# Patient Record
Sex: MALE | Race: WHITE | NOT HISPANIC OR LATINO | Employment: OTHER | URBAN - METROPOLITAN AREA
[De-identification: names, ages, dates, MRNs, and addresses within clinical notes are randomized per-mention and may not be internally consistent; named-entity substitution may affect disease eponyms.]

---

## 2018-07-30 ENCOUNTER — OFFICE VISIT (OUTPATIENT)
Dept: FAMILY MEDICINE CLINIC | Facility: CLINIC | Age: 78
End: 2018-07-30
Payer: COMMERCIAL

## 2018-07-30 VITALS
SYSTOLIC BLOOD PRESSURE: 128 MMHG | WEIGHT: 163 LBS | BODY MASS INDEX: 25.58 KG/M2 | HEART RATE: 84 BPM | DIASTOLIC BLOOD PRESSURE: 94 MMHG | HEIGHT: 67 IN

## 2018-07-30 DIAGNOSIS — E78.2 MIXED HYPERLIPIDEMIA: Primary | ICD-10-CM

## 2018-07-30 DIAGNOSIS — M65.30 TRIGGER FINGER, UNSPECIFIED FINGER, UNSPECIFIED LATERALITY: ICD-10-CM

## 2018-07-30 DIAGNOSIS — R41.3 MEMORY LOSS OR IMPAIRMENT: ICD-10-CM

## 2018-07-30 DIAGNOSIS — R97.20 ELEVATED PROSTATE SPECIFIC ANTIGEN (PSA): ICD-10-CM

## 2018-07-30 DIAGNOSIS — E03.9 ACQUIRED HYPOTHYROIDISM: ICD-10-CM

## 2018-07-30 PROCEDURE — 99203 OFFICE O/P NEW LOW 30 MIN: CPT | Performed by: FAMILY MEDICINE

## 2018-07-30 RX ORDER — LEVOTHYROXINE SODIUM 0.07 MG/1
1 TABLET ORAL DAILY
COMMUNITY
Start: 2011-08-05 | End: 2018-09-18 | Stop reason: SDUPTHER

## 2018-07-30 RX ORDER — CHLORHEXIDINE/GLYCERIN/HE-CELL
JELLY (GRAM) TOPICAL
COMMUNITY

## 2018-07-30 NOTE — PATIENT INSTRUCTIONS
Problem List Items Addressed This Visit        Endocrine    Hypothyroidism     Stable  Check TSH  Relevant Medications    levothyroxine 75 mcg tablet    Other Relevant Orders    TSH, 3rd generation       Musculoskeletal and Integument    Trigger finger     Findings of trigger fingers and arthritis  Recommend Hand surgeon to consider injections  Relevant Orders    Ambulatory referral to Hand Surgery       Other    Elevated prostate specific antigen (PSA)     Recommend follow with Uro and kathya free and total PSA first          Relevant Orders    Ambulatory referral to Urology    PSA, total and free    Hyperlipidemia - Primary     Due for check of labs  Follow after that  Relevant Orders    Comprehensive metabolic panel    Cholesterol, total    HDL cholesterol    LDL cholesterol, direct    Memory loss or impairment     Patient is quite concerned about memory loss  Will check labs, and then can follow up with [facility]           Relevant Orders    CBC and differential    RPR    Folate    Vitamin B12

## 2018-07-30 NOTE — PROGRESS NOTES
Assessment/Plan:    Elevated prostate specific antigen (PSA)  Recommend follow with Uro and kathya free and total PSA first     Hyperlipidemia  Due for check of labs  Follow after that  Hypothyroidism  Stable  Check TSH  Memory loss or impairment  Patient is quite concerned about memory loss  Will check labs, and then can follow up with [facility]  Trigger finger  Findings of trigger fingers and arthritis  Recommend Hand surgeon to consider injections  Diagnoses and all orders for this visit:    Mixed hyperlipidemia  -     Comprehensive metabolic panel; Future  -     Cholesterol, total; Future  -     HDL cholesterol; Future  -     LDL cholesterol, direct; Future    Acquired hypothyroidism  -     TSH, 3rd generation; Future    Elevated prostate specific antigen (PSA)  -     Ambulatory referral to Urology; Future  -     PSA, total and free; Future    Memory loss or impairment  -     CBC and differential; Future  -     RPR; Future  -     Folate; Future  -     Vitamin B12; Future    Trigger finger, unspecified finger, unspecified laterality  -     Ambulatory referral to Hand Surgery; Future    Other orders  -     levothyroxine 75 mcg tablet; Take 1 tablet by mouth daily  -     Omega-3 Fatty Acids ([facility] FISH OIL) 1000 MG CAPS; Take by mouth  -     Saw Palmetto, Serenoa repens, ([facility] SAW PALMETTO) 450 MG CAPS; Take by mouth          Subjective:   Re-establish care  No complaints  mjs       Patient ID: Lazarus Oman is a 77 y o  male  Patient is here to review medical conditions and to reestablish care  Has been a bit since he was here last   Due to insurance change, the patient went to a different office in [place]  He did mention that he has some memory issues  He did mention that he has some issues with regard to trying to remember certain things  He will remember them later on, but not right away  He is quite bothered by that  In fact, he has given up doing  because of this    He did mention that it seems to have gotten worse the last couple years  Cholesterol has been a problem for years, and he has been intolerant of medications  He has tried statins on numerous occasions, and had elevated CPK  He also had a problem with Zetia  PSA has been elevated, and the last was 7  He did have previous checks, went to Urology many years ago  At that point, however, his PSA was below 4  The most recent check in September of 2017 showed that it was greater than 7  Left hand there is some changes at 3rd and 5th fingers  Lock at times  Some changes consistant with DJD also noted  On right hand, 2 and 4 are abnormal   He was not able to play golf well due to this  The following portions of the patient's history were reviewed and updated as appropriate: allergies, current medications, past family history, past medical history, past social history, past surgical history and problem list     Review of Systems   Constitutional: Negative  HENT: Negative  Eyes: Negative  Respiratory: Negative  Psychiatric/Behavioral:        Per HPI, patient concerned about memory  All other systems reviewed and are negative  Objective:      /94   Pulse 84   Ht 5' 7" (1 702 m)   Wt 73 9 kg (163 lb)   BMI 25 53 kg/m²          Physical Exam   Constitutional: He is oriented to person, place, and time  He appears well-developed and well-nourished  No distress  HENT:   Head: Normocephalic and atraumatic  Mouth/Throat: Oropharynx is clear and moist  No oropharyngeal exudate  Eyes: Conjunctivae and EOM are normal  Pupils are equal, round, and reactive to light  Neck: Normal range of motion  Neck supple  No tracheal deviation present  No thyromegaly present  Cardiovascular: Normal rate, regular rhythm, normal heart sounds and intact distal pulses  Exam reveals no gallop and no friction rub  No murmur heard    Pulmonary/Chest: Effort normal and breath sounds normal  No respiratory distress  He has no wheezes  He has no rales  Abdominal: Soft  Bowel sounds are normal  He exhibits no distension  There is no tenderness  Musculoskeletal:        Hands:  Patient has multiple knuckles that are also abnormal    Neurological: He is alert and oriented to person, place, and time  Coordination normal    Skin: Skin is warm and dry  He is not diaphoretic  Psychiatric: He has a normal mood and affect  His behavior is normal  Judgment and thought content normal    Nursing note and vitals reviewed

## 2018-08-28 ENCOUNTER — OFFICE VISIT (OUTPATIENT)
Dept: FAMILY MEDICINE CLINIC | Facility: CLINIC | Age: 78
End: 2018-08-28
Payer: COMMERCIAL

## 2018-08-28 VITALS
BODY MASS INDEX: 25.16 KG/M2 | SYSTOLIC BLOOD PRESSURE: 150 MMHG | HEART RATE: 80 BPM | DIASTOLIC BLOOD PRESSURE: 100 MMHG | WEIGHT: 166 LBS | HEIGHT: 68 IN

## 2018-08-28 DIAGNOSIS — E03.9 ACQUIRED HYPOTHYROIDISM: ICD-10-CM

## 2018-08-28 DIAGNOSIS — R25.2 SPASTICITY: ICD-10-CM

## 2018-08-28 DIAGNOSIS — R41.3 MEMORY LOSS OR IMPAIRMENT: ICD-10-CM

## 2018-08-28 DIAGNOSIS — E78.2 MIXED HYPERLIPIDEMIA: ICD-10-CM

## 2018-08-28 DIAGNOSIS — R97.20 ELEVATED PROSTATE SPECIFIC ANTIGEN (PSA): Primary | ICD-10-CM

## 2018-08-28 DIAGNOSIS — E53.8 B12 DEFICIENCY: ICD-10-CM

## 2018-08-28 PROCEDURE — 99214 OFFICE O/P EST MOD 30 MIN: CPT | Performed by: FAMILY MEDICINE

## 2018-08-28 PROCEDURE — 1160F RVW MEDS BY RX/DR IN RCRD: CPT | Performed by: FAMILY MEDICINE

## 2018-08-28 PROCEDURE — 3008F BODY MASS INDEX DOCD: CPT | Performed by: FAMILY MEDICINE

## 2018-08-28 PROCEDURE — 3725F SCREEN DEPRESSION PERFORMED: CPT | Performed by: FAMILY MEDICINE

## 2018-08-28 PROCEDURE — 1036F TOBACCO NON-USER: CPT | Performed by: FAMILY MEDICINE

## 2018-08-28 PROCEDURE — 1101F PT FALLS ASSESS-DOCD LE1/YR: CPT | Performed by: FAMILY MEDICINE

## 2018-08-28 RX ORDER — PYRIDOXINE HCL (VITAMIN B6) 50 MG
50 TABLET ORAL DAILY
Qty: 90 TABLET | Refills: 3 | Status: SHIPPED | OUTPATIENT
Start: 2018-08-28 | End: 2021-02-25 | Stop reason: ALTCHOICE

## 2018-08-28 NOTE — ASSESSMENT & PLAN NOTE
Mini-mental status exam today was 30/30  Patient did quite well  No particular problems at the moment  Clock drawing test was minor early abnormal, more specifically he left the numbers off the clock  Otherwise, it was acceptable  Patient may be having some processing issues with this  At this point, I would recommend a vitamin B12 supplement, as his B12 level was slightly low

## 2018-08-28 NOTE — ASSESSMENT & PLAN NOTE
PSA continues to be elevated, and the % free corresponds to an increased risk of prostate cancer based on patient's age  Given this, refer to [facility] as they do accept his insurance

## 2018-08-28 NOTE — ASSESSMENT & PLAN NOTE
Cholesterol is fairly reasonable  At this point I would not recommend statins secondary to their potential for side effects  Continue on Omega threes, recheck in the future  By this, I mean at least 1 year from now

## 2018-08-28 NOTE — PROGRESS NOTES
Assessment/Plan:    Memory loss or impairment  Mini-mental status exam today was 30/30  Patient did quite well  No particular problems at the moment  Clock drawing test was minor early abnormal, more specifically he left the numbers off the clock  Otherwise, it was acceptable  Patient may be having some processing issues with this  At this point, I would recommend a vitamin B12 supplement, as his B12 level was slightly low  Hypothyroidism  TSH was normal   No changes  Follow-up in the future  Hyperlipidemia  Cholesterol is fairly reasonable  At this point I would not recommend statins secondary to their potential for side effects  Continue on Omega threes, recheck in the future  By this, I mean at least 1 year from now  Elevated prostate specific antigen (PSA)  PSA continues to be elevated, and the % free corresponds to an increased risk of prostate cancer based on patient's age  Given this, refer to [facility] as they do accept his insurance  B12 deficiency  Patient's B12 level is slightly low  I would recommend supplementation  Follow-up in the future  Spasticity  Some spasticity in movement  I would recommend CT to rule out CVA  Diagnoses and all orders for this visit:    Elevated prostate specific antigen (PSA)  -     Ambulatory referral to Urology; Future    Memory loss or impairment  -     vitamin B-12 (CYANOCOBALAMIN) 50 MCG TABS; Take 1 tablet (50 mcg total) by mouth daily  -     Vitamin B12; Future  -     TSH, 3rd generation; Future  -     CT head wo contrast; Future    Acquired hypothyroidism  -     TSH, 3rd generation; Future    Mixed hyperlipidemia    B12 deficiency    Spasticity  -     CT head wo contrast; Future          Subjective: pt presents to review recent bloodwork  --bb     Patient ID: Jorge Mcdonald is a 77 y o  male  Patient mentioned that he does want to see Urology    Unfortunately, we had set him up with Dr Sony Spencer and that office does not take his insurance  He wished to go to a different urologist     He is having some expressive aphasia, more specifically he mentioned that he knows what he wants to say the someone, just can't find the words all the time  After a bit, he does find the words, however  It may be after the conversation has concluded, unfortunately  With regard to cholesterol, he is not currently on medications other than omega-3 free fatty acids  He is on Synthroid for hypothyroidism  TSH is fairly stable as well  RPR was normal, folate was normal, however B12 was slightly low  The following portions of the patient's history were reviewed and updated as appropriate: allergies, current medications, past family history, past medical history, past social history, past surgical history and problem list     Review of Systems   Constitutional: Negative  HENT: Negative  Eyes: Negative  Respiratory: Negative  Cardiovascular: Negative  Gastrointestinal: Negative  Endocrine: Negative  Genitourinary: Negative  Musculoskeletal: Negative  Skin: Negative  Allergic/Immunologic: Negative  Neurological:        Memory loss  Hematological: Negative  Psychiatric/Behavioral: Positive for decreased concentration (memory issues)  Negative for suicidal ideas  Laboratory studies reviewed  Please see copies on the chart  TSH 2 2  This is essentially normal, and slightly elevated versus before  PSA 6 42   % free is 15  Total cholesterol 184, , HDL 56  RPR negative  B12 175 (normal 210), folate 10 8  CBC normal   Blood sugar 81, Creat 0 89, GFR 81, Na normal, K normal   AST 18, ALT 16     Objective:      /100 (BP Location: Left arm, Patient Position: Sitting, Cuff Size: Standard)   Pulse 80   Ht 5' 7 5" (1 715 m)   Wt 75 3 kg (166 lb)   BMI 25 62 kg/m²          Physical Exam   Constitutional: He appears well-developed and well-nourished     HENT:   Head: Normocephalic and atraumatic  Neck: Normal range of motion  Neck supple  Cardiovascular: Normal rate, regular rhythm and normal heart sounds  Exam reveals no gallop and no friction rub  No murmur heard  Pulses:       Carotid pulses are 2+ on the right side, and 2+ on the left side  Pulmonary/Chest: Effort normal and breath sounds normal  No respiratory distress  He has no wheezes  He has no rales  Nursing note and vitals reviewed

## 2018-08-28 NOTE — PATIENT INSTRUCTIONS
Problem List Items Addressed This Visit        Endocrine    Hypothyroidism     TSH was normal   No changes  Follow-up in the future  Relevant Orders    TSH, 3rd generation       Other    Elevated prostate specific antigen (PSA) - Primary     PSA continues to be elevated, and the % free corresponds to an increased risk of prostate cancer based on patient's age  Given this, refer to [facility] as they do accept his insurance  Relevant Orders    Ambulatory referral to Urology    Hyperlipidemia     Cholesterol is fairly reasonable  At this point I would not recommend statins secondary to their potential for side effects  Continue on Omega threes, recheck in the future  By this, I mean at least 1 year from now  Memory loss or impairment     Mini-mental status exam today was 30/30  Patient did quite well  No particular problems at the moment  Clock drawing test was minor early abnormal, more specifically he left the numbers off the clock  Otherwise, it was acceptable  Patient may be having some processing issues with this  At this point, I would recommend a vitamin B12 supplement, as his B12 level was slightly low  Relevant Medications    vitamin B-12 (CYANOCOBALAMIN) 50 MCG TABS    Other Relevant Orders    Vitamin B12    TSH, 3rd generation    CT head wo contrast    B12 deficiency     Patient's B12 level is slightly low  I would recommend supplementation  Follow-up in the future  Spasticity     Some spasticity in movement  I would recommend CT to rule out CVA           Relevant Orders    CT head wo contrast

## 2018-09-18 ENCOUNTER — TELEPHONE (OUTPATIENT)
Dept: FAMILY MEDICINE CLINIC | Facility: CLINIC | Age: 78
End: 2018-09-18

## 2018-09-18 DIAGNOSIS — E03.9 ACQUIRED HYPOTHYROIDISM: Primary | ICD-10-CM

## 2018-09-18 RX ORDER — LEVOTHYROXINE SODIUM 0.07 MG/1
75 TABLET ORAL DAILY
Qty: 90 TABLET | Refills: 3 | Status: SHIPPED | OUTPATIENT
Start: 2018-09-18 | End: 2019-09-16 | Stop reason: SDUPTHER

## 2018-09-18 NOTE — TELEPHONE ENCOUNTER
Pt called and needs refill on Levothyroxine 75mcg  90 days at a time refilled 3x  Pharmacy: [facility] on [address]      Thank you

## 2018-12-06 ENCOUNTER — OFFICE VISIT (OUTPATIENT)
Dept: FAMILY MEDICINE CLINIC | Facility: CLINIC | Age: 78
End: 2018-12-06
Payer: COMMERCIAL

## 2018-12-06 VITALS
HEART RATE: 80 BPM | DIASTOLIC BLOOD PRESSURE: 76 MMHG | HEIGHT: 68 IN | SYSTOLIC BLOOD PRESSURE: 128 MMHG | BODY MASS INDEX: 25.46 KG/M2 | WEIGHT: 168 LBS

## 2018-12-06 DIAGNOSIS — E53.8 B12 DEFICIENCY: ICD-10-CM

## 2018-12-06 DIAGNOSIS — E03.9 ACQUIRED HYPOTHYROIDISM: Primary | ICD-10-CM

## 2018-12-06 DIAGNOSIS — R41.3 MEMORY LOSS OR IMPAIRMENT: ICD-10-CM

## 2018-12-06 DIAGNOSIS — R25.2 SPASTICITY: ICD-10-CM

## 2018-12-06 PROCEDURE — 3008F BODY MASS INDEX DOCD: CPT | Performed by: FAMILY MEDICINE

## 2018-12-06 PROCEDURE — 1160F RVW MEDS BY RX/DR IN RCRD: CPT | Performed by: FAMILY MEDICINE

## 2018-12-06 PROCEDURE — 4040F PNEUMOC VAC/ADMIN/RCVD: CPT | Performed by: FAMILY MEDICINE

## 2018-12-06 PROCEDURE — 99214 OFFICE O/P EST MOD 30 MIN: CPT | Performed by: FAMILY MEDICINE

## 2018-12-06 NOTE — ASSESSMENT & PLAN NOTE
Patient's movements today were somewhat spastic as well  Again, I recommended that he have the CT scan that was ordered previously  He did not recall having that order, though was on the after visit summary

## 2018-12-06 NOTE — PATIENT INSTRUCTIONS
Problem List Items Addressed This Visit        Endocrine    Hypothyroidism - Primary     He had labs ordered before  He does not recall that they were ordered  I recommend that he go to the lab after office visit to have this drawn  Other    Memory loss or impairment     Patient did score well previously on the mental status exam, however he still does have some issues with regard to memory  Unfortunately, he did not recall me asking to have a CT scan, or blood work  This is in contrast to him previously as I recall seeing him previously  He could not understand that this was something that I had recommended previously  Time line was also somewhat abnormal, mentioning initially that it was 1 month ago when it was in August          B12 deficiency     He was to have a B12 level done, but did not recall that I had ordered it  I reprinted this order and asked that he go to the lab after this OV to get this done  Spasticity     Patient's movements today were somewhat spastic as well  Again, I recommended that he have the CT scan that was ordered previously  He did not recall having that order, though was on the after visit summary

## 2018-12-06 NOTE — PROGRESS NOTES
Assessment/Plan:    B12 deficiency  He was to have a B12 level done, but did not recall that I had ordered it  I reprinted this order and asked that he go to the lab after this OV to get this done  Hypothyroidism  He had labs ordered before  He does not recall that they were ordered  I recommend that he go to the lab after office visit to have this drawn  Memory loss or impairment  Patient did score well previously on the mental status exam, however he still does have some issues with regard to memory  Unfortunately, he did not recall me asking to have a CT scan, or blood work  This is in contrast to him previously as I recall seeing him previously  He could not understand that this was something that I had recommended previously  Time line was also somewhat abnormal, mentioning initially that it was 1 month ago when it was in August     Spasticity  Patient's movements today were somewhat spastic as well  Again, I recommended that he have the CT scan that was ordered previously  He did not recall having that order, though was on the after visit summary  Diagnoses and all orders for this visit:    Acquired hypothyroidism    Memory loss or impairment    B12 deficiency    Spasticity          After the visit, I confirmed with patient's wife, Larry Mace, that the patient did indeed get told about the labs, and the CT scan  She mentioned that family has also noticed he has had some issues with memory  I informed her of the current issues with regard to laboratory studies, as well as the CT scan  After this, I would probably recommend Neurology  I did speak with her about that, but not make the recommendations specifically at that time  Of note, patient no longer has a home phone, just a cell phone  His wife also just has a cell phone  Her number is [phone]  Subjective: Follow up   Pt states he was started on Vitamin B12 and he thinks that is why he needed to follow up  kw     Patient ID: Edward Esposito is a 77 y o  male  Patient is here today for follow-up  He reports that he is doing quite well over the last month  He has not noticed anything significant  He did mention that he is having more bowel movements than normal   The bowel movements are more frequent, but less size wise  There was no specific changes with the bowels otherwise, just more frequent  There is no pain with passing stools, no difficulty in passing them either  He is taking vitamin B12 now, but is using half a tablet so that he can get 50 mcg  Patient reports he was unaware of the labs he was to have, as well as he did not know about the CT scan  The following portions of the patient's history were reviewed and updated as appropriate: allergies, current medications, past family history, past medical history, past social history, past surgical history and problem list     Review of Systems   Constitutional: Negative  HENT: Negative  Respiratory: Negative  Cardiovascular: Negative  Neurological: Negative  Psychiatric/Behavioral: Negative  All other systems reviewed and are negative  Objective:      /76 (BP Location: Left arm)   Pulse 80   Ht 5' 7 5" (1 715 m)   Wt 76 2 kg (168 lb)   BMI 25 92 kg/m²          Physical Exam   Constitutional: He is oriented to person, place, and time  He appears well-developed and well-nourished  HENT:   Head: Normocephalic and atraumatic  Neck: Normal range of motion  Neck supple  Cardiovascular: Normal rate, regular rhythm and normal heart sounds  Exam reveals no gallop and no friction rub  No murmur heard  Pulses:       Carotid pulses are 2+ on the right side, and 2+ on the left side  Pulmonary/Chest: Effort normal and breath sounds normal  No respiratory distress  He has no wheezes  He has no rales  Neurological: He is alert and oriented to person, place, and time  He has normal reflexes   Coordination abnormal  Psychiatric: He has a normal mood and affect  He exhibits abnormal recent memory  Nursing note and vitals reviewed

## 2018-12-06 NOTE — ASSESSMENT & PLAN NOTE
Patient did score well previously on the mental status exam, however he still does have some issues with regard to memory  Unfortunately, he did not recall me asking to have a CT scan, or blood work  This is in contrast to him previously as I recall seeing him previously  He could not understand that this was something that I had recommended previously    Time line was also somewhat abnormal, mentioning initially that it was 1 month ago when it was in August

## 2018-12-06 NOTE — ASSESSMENT & PLAN NOTE
He had labs ordered before  He does not recall that they were ordered  I recommend that he go to the lab after office visit to have this drawn

## 2018-12-06 NOTE — ASSESSMENT & PLAN NOTE
He was to have a B12 level done, but did not recall that I had ordered it  I reprinted this order and asked that he go to the lab after this OV to get this done

## 2018-12-24 ENCOUNTER — TELEPHONE (OUTPATIENT)
Dept: FAMILY MEDICINE CLINIC | Facility: CLINIC | Age: 78
End: 2018-12-24

## 2018-12-24 NOTE — TELEPHONE ENCOUNTER
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

## 2018-12-24 NOTE — TELEPHONE ENCOUNTER
----- Message from Caitlyn Steen MD sent at 12/21/2018 12:58 PM EST -----  I wanted this to be a message to you guys so you would understand what was going on, i e  see previous task from this patient that Topher Leonard had sent me  It was a task from verify results  I did not necessarily want all of this in the chart right away   ----- Message -----  From: Grisel Pleitez  Sent: 12/21/2018  10:36 AM  To: Caitlyn Steen MD    Can you please let me know what this message is in reference to  It's like you picked up on a conversation in the middle  Please clarify  ----- Message -----  From: Caitlyn Steen MD  Sent: 12/20/2018   5:05 PM  To: [address]     My plan from at this point is not to do anything different, just to let his wife know that there were some changes relating to vascular issues  He still has issues with regard to dementia  I would follow up at his next visit, and would ask that she come with him

## 2019-01-11 ENCOUNTER — OFFICE VISIT (OUTPATIENT)
Dept: FAMILY MEDICINE CLINIC | Facility: CLINIC | Age: 79
End: 2019-01-11
Payer: COMMERCIAL

## 2019-01-11 ENCOUNTER — TELEPHONE (OUTPATIENT)
Dept: NEUROLOGY | Facility: CLINIC | Age: 79
End: 2019-01-11

## 2019-01-11 VITALS
HEIGHT: 68 IN | RESPIRATION RATE: 16 BRPM | SYSTOLIC BLOOD PRESSURE: 124 MMHG | WEIGHT: 164 LBS | HEART RATE: 76 BPM | BODY MASS INDEX: 24.86 KG/M2 | DIASTOLIC BLOOD PRESSURE: 72 MMHG

## 2019-01-11 DIAGNOSIS — R41.3 MEMORY LOSS OR IMPAIRMENT: ICD-10-CM

## 2019-01-11 DIAGNOSIS — E78.2 MIXED HYPERLIPIDEMIA: ICD-10-CM

## 2019-01-11 DIAGNOSIS — R90.82 WHITE MATTER DISEASE: Primary | ICD-10-CM

## 2019-01-11 DIAGNOSIS — R97.20 ELEVATED PROSTATE SPECIFIC ANTIGEN (PSA): ICD-10-CM

## 2019-01-11 DIAGNOSIS — R25.2 SPASTICITY: ICD-10-CM

## 2019-01-11 PROCEDURE — 3725F SCREEN DEPRESSION PERFORMED: CPT | Performed by: FAMILY MEDICINE

## 2019-01-11 PROCEDURE — G0439 PPPS, SUBSEQ VISIT: HCPCS | Performed by: FAMILY MEDICINE

## 2019-01-11 PROCEDURE — 1125F AMNT PAIN NOTED PAIN PRSNT: CPT | Performed by: FAMILY MEDICINE

## 2019-01-11 PROCEDURE — 1170F FXNL STATUS ASSESSED: CPT | Performed by: FAMILY MEDICINE

## 2019-01-11 PROCEDURE — 99214 OFFICE O/P EST MOD 30 MIN: CPT | Performed by: FAMILY MEDICINE

## 2019-01-11 PROCEDURE — 1101F PT FALLS ASSESS-DOCD LE1/YR: CPT | Performed by: FAMILY MEDICINE

## 2019-01-11 NOTE — PATIENT INSTRUCTIONS
Problem List Items Addressed This Visit        Nervous and Auditory    White matter disease - Primary     Given that the CT did show some white matter disease as well as lacunar infarcts, is likely that some of his memory loss is secondary to CVA/white matter disease  At this point, the best bet for him is to try and keep his cholesterol is low as possible, keep the blood pressure under control, and follow up as needed  This may reoccur down the road, or get worse as time goes by  He also has the option to go to Neurology if he chooses  Relevant Orders    Ambulatory referral to Neurology       Other    Elevated prostate specific antigen (PSA)     Reviewed note from Neurology  They recommended that the patient repeat a PSA in approximately 1 year  Hyperlipidemia     Given that the patient previously had problems with statins, and that is LDL is slightly over 100, it is reasonable to consider treatment however I do not expect that he will do well with treatment  At this point, would not make any changes  Continue with the Omega 3 free fatty acids (Fish oil)  Memory loss or impairment     Given that the CT did show some white matter disease as well as lacunar infarcts, is likely that some of his memory loss is secondary to CVA/white matter disease  At this point, the best bet for him is to try and keep his cholesterol is low as possible, keep the blood pressure under control, and follow up as needed  This may reoccur down the road, or get worse as time goes by  He also has the option to go to Neurology if he chooses  Relevant Orders    Ambulatory referral to Neurology    Spasticity     Movements are abnormal for a bit now  I would recommend follow with Neuro as there is a change in movement  I would question Parkinsons vs vascular changes             Relevant Orders    Ambulatory referral to Neurology

## 2019-01-11 NOTE — ASSESSMENT & PLAN NOTE
Given that the CT did show some white matter disease as well as lacunar infarcts, is likely that some of his memory loss is secondary to CVA/white matter disease  At this point, the best bet for him is to try and keep his cholesterol is low as possible, keep the blood pressure under control, and follow up as needed  This may reoccur down the road, or get worse as time goes by  He also has the option to go to Neurology if he chooses

## 2019-01-11 NOTE — ASSESSMENT & PLAN NOTE
Reviewed note from Neurology  They recommended that the patient repeat a PSA in approximately 1 year

## 2019-01-11 NOTE — ASSESSMENT & PLAN NOTE
Movements are abnormal for a bit now  I would recommend follow with Neuro as there is a change in movement  I would question Parkinsons vs vascular changes

## 2019-01-11 NOTE — PROGRESS NOTES
Assessment/Plan:    Memory loss or impairment  Given that the CT did show some white matter disease as well as lacunar infarcts, is likely that some of his memory loss is secondary to CVA/white matter disease  At this point, the best bet for him is to try and keep his cholesterol is low as possible, keep the blood pressure under control, and follow up as needed  This may reoccur down the road, or get worse as time goes by  He also has the option to go to Neurology if he chooses  White matter disease  Given that the CT did show some white matter disease as well as lacunar infarcts, is likely that some of his memory loss is secondary to CVA/white matter disease  At this point, the best bet for him is to try and keep his cholesterol is low as possible, keep the blood pressure under control, and follow up as needed  This may reoccur down the road, or get worse as time goes by  He also has the option to go to Neurology if he chooses  Hyperlipidemia  Given that the patient previously had problems with statins, and that is LDL is slightly over 100, it is reasonable to consider treatment however I do not expect that he will do well with treatment  At this point, would not make any changes  Continue with the Omega 3 free fatty acids (Fish oil)  Elevated prostate specific antigen (PSA)  Reviewed note from Neurology  They recommended that the patient repeat a PSA in approximately 1 year  Spasticity  Movements are abnormal for a bit now  I would recommend follow with Neuro as there is a change in movement  I would question Parkinsons vs vascular changes  Diagnoses and all orders for this visit:    White matter disease  -     Ambulatory referral to Neurology; Future    Memory loss or impairment  -     Ambulatory referral to Neurology; Future    Elevated prostate specific antigen (PSA)    Mixed hyperlipidemia    Spasticity  -     Ambulatory referral to Neurology;  Future          Subjective: Pt here today for CT Scan results  YENI Danielle     Patient ID: Orville Setting is a 77 y o  male  Patient here to review studies after last OV  He did mention that he found something to review nocturia  He found something in an ad that was discussing BPH and injecting into the prostate or around the prostate to decrease urinary issues  The following portions of the patient's history were reviewed and updated as appropriate: allergies, current medications, past family history, past medical history, past social history, past surgical history and problem list     Review of Systems   Constitutional: Negative  HENT: Negative  Eyes: Negative  Respiratory: Negative  Cardiovascular: Negative  Gastrointestinal: Negative  Endocrine: Negative  Genitourinary: Negative  Musculoskeletal: Negative  Skin: Negative  Allergic/Immunologic: Negative  Neurological: Negative  Hematological: Negative  Psychiatric/Behavioral: Negative  Objective:      Vitals:    01/11/19 1253   BP: 124/72   BP Location: Left arm   Patient Position: Sitting   Cuff Size: Standard   Pulse: 76   Resp: 16   Weight: 74 4 kg (164 lb)   Height: 5' 7 5" (1 715 m)          Physical Exam   Constitutional: He appears well-developed and well-nourished  HENT:   Head: Normocephalic and atraumatic  Neck: Normal range of motion  Neck supple  Cardiovascular: Normal rate, regular rhythm and normal heart sounds  Exam reveals no gallop and no friction rub  No murmur heard  Pulses:       Carotid pulses are 2+ on the right side, and 2+ on the left side  Pulmonary/Chest: Effort normal and breath sounds normal  No respiratory distress  He has no wheezes  He has no rales  Nursing note and vitals reviewed

## 2019-01-11 NOTE — PROGRESS NOTES
Assessment and Plan:    Problem List Items Addressed This Visit        Nervous and Auditory    White matter disease - Primary     Given that the CT did show some white matter disease as well as lacunar infarcts, is likely that some of his memory loss is secondary to CVA/white matter disease  At this point, the best bet for him is to try and keep his cholesterol is low as possible, keep the blood pressure under control, and follow up as needed  This may reoccur down the road, or get worse as time goes by  He also has the option to go to Neurology if he chooses  Relevant Orders    Ambulatory referral to Neurology       Other    Elevated prostate specific antigen (PSA)     Reviewed note from Neurology  They recommended that the patient repeat a PSA in approximately 1 year  Hyperlipidemia     Given that the patient previously had problems with statins, and that is LDL is slightly over 100, it is reasonable to consider treatment however I do not expect that he will do well with treatment  At this point, would not make any changes  Continue with the Omega 3 free fatty acids (Fish oil)  Memory loss or impairment     Given that the CT did show some white matter disease as well as lacunar infarcts, is likely that some of his memory loss is secondary to CVA/white matter disease  At this point, the best bet for him is to try and keep his cholesterol is low as possible, keep the blood pressure under control, and follow up as needed  This may reoccur down the road, or get worse as time goes by  He also has the option to go to Neurology if he chooses  Relevant Orders    Ambulatory referral to Neurology    Spasticity     Movements are abnormal for a bit now  I would recommend follow with Neuro as there is a change in movement  I would question Parkinsons vs vascular changes             Relevant Orders    Ambulatory referral to Neurology        Health Maintenance Due   Topic Date Due    Medicare Annual Wellness Visit (AWV)  1940    CRC Screening: Colonoscopy  1940    DTaP,Tdap,and Td Vaccines (1 - Tdap) 01/15/1960    Pneumococcal PPSV23/PCV13 65+ Years / High and Highest Risk (2 of 2 - PCV13) 10/20/2017         HPI:  Victoria Sinclair is a 77 y o  male here for his Subsequent Wellness Visit  Patient Active Problem List   Diagnosis    Allergic rhinitis    Arthritis    Actinic keratosis    Elevated prostate specific antigen (PSA)    Erectile dysfunction of non-organic origin    External hemorrhoids    Gout    Hyperlipidemia    Hypothyroidism    Memory loss or impairment    Trigger finger    B12 deficiency    Spasticity    White matter disease     Past Medical History:   Diagnosis Date    Basal cell adenocarcinoma     right thigh     Past Surgical History:   Procedure Laterality Date    VASECTOMY       Family History   Problem Relation Age of Onset    Alzheimer's disease Mother     Lymphoma Father      History   Smoking Status    Never Smoker   Smokeless Tobacco    Never Used     History   Alcohol Use    Yes     Comment: occasional      History   Drug Use No       Current Outpatient Prescriptions   Medication Sig Dispense Refill    levothyroxine 75 mcg tablet Take 1 tablet (75 mcg total) by mouth daily 90 tablet 3    Omega-3 Fatty Acids ([facility] FISH OIL) 1000 MG CAPS Take by mouth      vitamin B-12 (CYANOCOBALAMIN) 50 MCG TABS Take 1 tablet (50 mcg total) by mouth daily 90 tablet 3     No current facility-administered medications for this visit        Allergies   Allergen Reactions    Simvastatin Myalgia     Other reaction(s): elevated CPK, Rhabdomyolysis    Statins Myalgia     Immunization History   Administered Date(s) Administered    H1N1, All Formulations 01/08/2010    Influenza 10/20/2016    Influenza Recombinant Preservative Free Im 10/09/2018    Influenza Split High Dose Preservative Free IM 12/10/2012, 09/25/2014    Pneumococcal Polysaccharide PPV23 02/16/2005, 10/20/2016       Patient Care Team:  Raul Otto MD as PCP - General    Medicare Screening Tests and Risk Assessments:  Sergio Cantu is here for his Subsequent Wellness visit  Health Risk Assessment:  Patient rates overall health as very good  Patient feels that their physical health rating is Same  Eyesight was rated as Same  Hearing was rated as Same  Patient feels that their emotional and mental health rating is Same  Pain experienced by patient in the last 7 days has been None  Patient's pain rating has been 1/10  Patient states that he has experienced no weight loss or gain in last 6 months  Emotional/Mental Health:  Patient has been feeling nervous/anxious  PHQ-9 Depression Screening:    Frequency of the following problems over the past two weeks:      1  Little interest or pleasure in doing things: 0 - not at all      2  Feeling down, depressed, or hopeless: 0 - not at all  PHQ-2 Score: 0          Broken Bones/Falls: Fall Risk Assessment:    In the past year, patient has experienced: No history of falling in past year          Bladder/Bowel:  Patient has not leaked urine accidently in the last six months  Patient reports no loss of bowel control  Immunizations:  Patient has had a flu vaccination within the last year  Patient has received a pneumonia shot  Patient has received a shingles shot  Patient has not received tetanus/diphtheria shot  Home Safety:  Patient does not have trouble with stairs inside or outside of their home  Patient currently reports that there are no safety hazards present in home, working smoke alarms, working carbon monoxide detectors        Preventative Screenings:   prostate cancer screen performed, colon cancer screen completed, cholesterol screen completed, glaucoma eye exam completed,     Nutrition:  Current diet: Regular and Limited junk food with servings of the following:    Medications:  Patient is currently taking over-the-counter supplements  Patient is able to manage medications  Lifestyle Choices:  Patient reports no tobacco use  Patient has not smoked or used tobacco in the past   Patient reports no alcohol use  Patient drives a vehicle  Patient wears seat belt  Activities of Daily Living:  Can get out of bed by his or her self, able to dress self, able to make own meals, able to do own shopping, able to bathe self, can do own laundry/housekeeping, can manage own money, pay bills and track expenses    Previous Hospitalizations:  No hospitalization or ED visit in past 12 months        Advanced Directives:  Patient has decided on a power of   Patient has spoken to designated power of   Patient has completed advanced directive  Preventative Screening/Counseling:      Cardiovascular:      General: Screening Not Indicated          Diabetes:      General: Screening Current          Colorectal Cancer:      General: Screening Current          Prostate Cancer:      General: Screening Current      Comments: uro at [facility]        Osteoporosis:      General: Risks and Benefits Discussed      Counseling: Calcium and Vitamin D Intake and Regular Weightbearing Exercise          AAA:      General: Patient Declines          Glaucoma:      General: Risks and Benefits Discussed      Comments: Eyecare professional: 1 week ago  HIV:      General: Screening Not Indicated          Hepatitis C:      General: Screening Not Indicated        Advanced Directives:   Patient has no living will for healthcare, does not have durable POA for healthcare, patient does not have an advanced directive  5 wishes given

## 2019-01-11 NOTE — ASSESSMENT & PLAN NOTE
Given that the patient previously had problems with statins, and that is LDL is slightly over 100, it is reasonable to consider treatment however I do not expect that he will do well with treatment  At this point, would not make any changes  Continue with the Omega 3 free fatty acids (Fish oil)

## 2019-01-14 ENCOUNTER — DOCUMENTATION (OUTPATIENT)
Dept: FAMILY MEDICINE CLINIC | Facility: CLINIC | Age: 79
End: 2019-01-14

## 2019-01-14 NOTE — PROGRESS NOTES
Patient brought in a clipping from a recent magazine  It is a noose letter called [employer]  The page is presented to me showed that the patient was considering a procedure that was listed called prostate artery embolization  This was supposed to help shrink the prostate without surgery, medications, or other dysfunction  This was only a small particle printed in this news letter, and still discuss the possibility of clinical trials for this  Based on this, this is not currently indicated for BPH  It would be considered invasive, and would potentially have some significant side effects  Of note, I would not recommend this at this time, and would recommend that he speak with Urology if he is having more issues with regard to urination  He is currently following with them as well

## 2019-03-14 ENCOUNTER — TELEPHONE (OUTPATIENT)
Dept: NEUROLOGY | Facility: CLINIC | Age: 79
End: 2019-03-14

## 2019-03-15 ENCOUNTER — TELEPHONE (OUTPATIENT)
Dept: NEUROLOGY | Facility: CLINIC | Age: 79
End: 2019-03-15

## 2019-03-15 ENCOUNTER — OFFICE VISIT (OUTPATIENT)
Dept: NEUROLOGY | Facility: CLINIC | Age: 79
End: 2019-03-15
Payer: COMMERCIAL

## 2019-03-15 VITALS
HEIGHT: 68 IN | WEIGHT: 166 LBS | DIASTOLIC BLOOD PRESSURE: 89 MMHG | SYSTOLIC BLOOD PRESSURE: 164 MMHG | HEART RATE: 83 BPM | BODY MASS INDEX: 25.16 KG/M2

## 2019-03-15 DIAGNOSIS — G31.84 AMNESTIC MCI (MILD COGNITIVE IMPAIRMENT WITH MEMORY LOSS): ICD-10-CM

## 2019-03-15 DIAGNOSIS — R41.3 MEMORY LOSS OR IMPAIRMENT: ICD-10-CM

## 2019-03-15 DIAGNOSIS — R90.82 WHITE MATTER DISEASE: ICD-10-CM

## 2019-03-15 DIAGNOSIS — R25.2 SPASTICITY: ICD-10-CM

## 2019-03-15 DIAGNOSIS — I63.313 CEREBROVASCULAR ACCIDENT (CVA) DUE TO BILATERAL THROMBOSIS OF MIDDLE CEREBRAL ARTERIES (HCC): Primary | ICD-10-CM

## 2019-03-15 PROCEDURE — 99205 OFFICE O/P NEW HI 60 MIN: CPT | Performed by: PSYCHIATRY & NEUROLOGY

## 2019-03-15 NOTE — TELEPHONE ENCOUNTER
Pt's wife Afshin Barrientos called stating pt refused to allow her to accompany him to today's appt  She states is trying to be supportive of his wishes but is concerned as he has been having memory issues  I made her aware that we cannot release notes without JUAN from pt  I made her aware that pt did add her to his communication consent form  Appt in progress and therefore no office note to review  I advised her that pt will be provided with AVS and copies of any orders  Wife verbalized understanding and will call us with any additional concerns

## 2019-03-15 NOTE — PROGRESS NOTES
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 ([facility] [address] ) - Primary    Relevant Orders    MRI brain NeuroQuant wo contrast    Amnestic MCI (mild cognitive impairment with memory loss)       Other    Memory loss or impairment    Relevant Orders    MRI brain NeuroQuant wo contrast    VAS carotid complete study    Ambulatory referral to Speech Therapy    TSH, 3rd generation with Free T4 reflex    Vitamin B12    LATISHA and PE,Serum    Spasticity         Mr Ayla Sawyer has presented for evaluation of white matter disease wit CT head was completed in 2018 nd was consistent with 2 small lacunar strokes in MCA distribution  Patient is aware, that with no CD, I would not be able to review imaging as they were completed at [facility]  Patient was advised starting aspirin 81 mg Po daily  MRI brain with Neuroquant was added along with Carotid Doppler US to complete neurologic part of work up of the stroke  Patient has no focal neurologic deficit on exam due to been physically active  Mercedes Iniguez will be advised to be completed by Primary care team   Patient also was evaluated for dementia - 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 type of MCI with stable visuo-spatial and executive function  ,Patient was advised to have Cognitive therapy, with no recommendations for for aricept as no basic work up had been completed for cardiac function stand point including LVPG/SLPG teams  Follow up with [facility] Nurse in 2-3 months  Subjective: white matter disease and memory issues    HPI /History of Present Illness  Mr Ayla Sawyer has presented today with complaints of forgetfulness     Patient described no signs of tremor or falls, no delusional states or hallucinations  CT head was done in December 2018 for the reason of memory issues and spasticity   Patient was noted to have 2 chronic lacunar CVAs, with no particular time frame when patient would be aware of focal neurologic dysfunction  MOCA was 22/30 with 0/5 words  CT head was consistent with cerebral atrophy, white matter disease , with old lacunar strokes at right caudate and left basal ganglia  NO CD was brought to his office visit  We discussed memory loss started over the last 2 years with short term memory issues noted  Patient has been describing he was diagnosed with spasticity- patient came from dolly trip in [facility], no falls, no tremors, no lower back pain was reported  Memory loss with some forgetfulness, no difficulties with names; no problem with driving, no major difficulties with financial affairs; Patient has been forgetting words, and some words finding difficulties still reported  Patient appeared anxious today  No focal neurologic deficit reported  The following portions of the patient's history were reviewed and updated as appropriate:   He  has a past medical history of Basal cell adenocarcinoma    He   Patient Active Problem List    Diagnosis Date Noted    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 ([facility] [address] ) 03/15/2019    Amnestic MCI (mild cognitive impairment with memory loss) 03/15/2019    White matter disease 01/11/2019    B12 deficiency 08/28/2018    Spasticity 08/28/2018    Memory loss or impairment 07/30/2018    Trigger finger 07/30/2018    Arthritis 01/13/2014    Allergic rhinitis 11/16/2012    Actinic keratosis 11/16/2012    Erectile dysfunction of non-organic origin 11/16/2012    Gout 11/16/2012    Elevated prostate specific antigen (PSA) 05/07/2012    External hemorrhoids 05/07/2012    Hyperlipidemia 05/07/2012    Hypothyroidism 05/07/2012     He  has a past surgical history that includes Vasectomy  His family history includes Alzheimer's disease in his mother; Lymphoma in his father  He  reports that he has never smoked  He has never used smokeless tobacco  He reports that he drinks alcohol  He reports that he does not use drugs  Current Outpatient Medications   Medication Sig Dispense Refill    levothyroxine 75 mcg tablet Take 1 tablet (75 mcg total) by mouth daily 90 tablet 3    Omega-3 Fatty Acids ([employer] FISH OIL) 1000 MG CAPS Take by mouth      vitamin B-12 (CYANOCOBALAMIN) 50 MCG TABS Take 1 tablet (50 mcg total) by mouth daily 90 tablet 3     No current facility-administered medications for this visit  Current Outpatient Medications on File Prior to Visit   Medication Sig    levothyroxine 75 mcg tablet Take 1 tablet (75 mcg total) by mouth daily    Omega-3 Fatty Acids ([employer] FISH OIL) 1000 MG CAPS Take by mouth    vitamin B-12 (CYANOCOBALAMIN) 50 MCG TABS Take 1 tablet (50 mcg total) by mouth daily     No current facility-administered medications on file prior to visit  He is allergic to simvastatin and statins            Objective:    Blood pressure 164/89, pulse 83, height 5' 7 5" (1 715 m), weight 75 3 kg (166 lb)  Physical Exam/Neurological Exam  CONSTITUTIONAL: NAD, pleasant  NECK: supple, no lymphadenopathy, no thyromegaly, no JVD  CARDIOVASCULAR: RRR, normal S1S2, no murmurs, no rubs  RESP: clear to auscultation bilaterally, no wheezes/rhonchi/rales  ABDOMEN: soft, non tender, non distended  SKIN: no rash or skin lesions  EXTREMITIES: no edema, pulses 2+bilaterally  PSYCH: appropriate mood and affect  NEUROLOGIC COMPREHENSIVE EXAM: Patient is oriented to person, place and time, NAD; appropriate affect  CN II, III, IV, V, VI, VII,VIII,IX,X,XI-XII intact with EOMI, PERRLA, OKN intact, VF grossly intact, fundi poorly visualized secondary to pupillary constriction; symmetric face noted   Motor: 5/5 UE/LE bilateral symmetric; Sensory: intact to light touch and pinprick bilaterally; normal vibration sensation feet bilaterally; Coordination within normal limits on FTN and MORENA testing; DTR: 2/4 through, no Babinski, no clonus  Tandem gait is intact  Romberg: negative  ROS:  12 points of review of system was reviewed with the patient and was unremarkable with exception: see HPI  Review of Systems   Constitutional: Positive for fatigue  HENT: Negative  Eyes: Negative  Respiratory: Negative  Cardiovascular: Negative  Gastrointestinal: Negative  Endocrine: Negative  Genitourinary: Positive for frequency and urgency  Musculoskeletal: Negative  Skin: Negative  Allergic/Immunologic: Negative  Neurological: Negative  Twitching, memory complaints, clumsiness  Hematological: Negative  Psychiatric/Behavioral: Negative

## 2019-07-15 ENCOUNTER — OFFICE VISIT (OUTPATIENT)
Dept: FAMILY MEDICINE CLINIC | Facility: CLINIC | Age: 79
End: 2019-07-15
Payer: COMMERCIAL

## 2019-07-15 VITALS
WEIGHT: 164 LBS | SYSTOLIC BLOOD PRESSURE: 150 MMHG | BODY MASS INDEX: 24.86 KG/M2 | HEART RATE: 84 BPM | HEIGHT: 68 IN | DIASTOLIC BLOOD PRESSURE: 80 MMHG

## 2019-07-15 DIAGNOSIS — I10 ESSENTIAL HYPERTENSION: ICD-10-CM

## 2019-07-15 DIAGNOSIS — I63.313 CEREBROVASCULAR ACCIDENT (CVA) DUE TO BILATERAL THROMBOSIS OF MIDDLE CEREBRAL ARTERIES (HCC): Primary | ICD-10-CM

## 2019-07-15 DIAGNOSIS — R97.20 ELEVATED PROSTATE SPECIFIC ANTIGEN (PSA): ICD-10-CM

## 2019-07-15 DIAGNOSIS — R25.2 SPASTICITY: ICD-10-CM

## 2019-07-15 DIAGNOSIS — G31.84 AMNESTIC MCI (MILD COGNITIVE IMPAIRMENT WITH MEMORY LOSS): ICD-10-CM

## 2019-07-15 DIAGNOSIS — I45.10 RBBB (RIGHT BUNDLE BRANCH BLOCK): ICD-10-CM

## 2019-07-15 DIAGNOSIS — Z12.11 SCREEN FOR COLON CANCER: ICD-10-CM

## 2019-07-15 DIAGNOSIS — E78.2 MIXED HYPERLIPIDEMIA: ICD-10-CM

## 2019-07-15 DIAGNOSIS — R90.82 WHITE MATTER DISEASE: ICD-10-CM

## 2019-07-15 DIAGNOSIS — E03.9 ACQUIRED HYPOTHYROIDISM: ICD-10-CM

## 2019-07-15 PROCEDURE — 1036F TOBACCO NON-USER: CPT | Performed by: FAMILY MEDICINE

## 2019-07-15 PROCEDURE — 93000 ELECTROCARDIOGRAM COMPLETE: CPT | Performed by: FAMILY MEDICINE

## 2019-07-15 PROCEDURE — 99214 OFFICE O/P EST MOD 30 MIN: CPT | Performed by: FAMILY MEDICINE

## 2019-07-15 PROCEDURE — 1160F RVW MEDS BY RX/DR IN RCRD: CPT | Performed by: FAMILY MEDICINE

## 2019-07-15 RX ORDER — LISINOPRIL 5 MG/1
5 TABLET ORAL DAILY
Qty: 30 TABLET | Refills: 5 | Status: SHIPPED | OUTPATIENT
Start: 2019-07-15 | End: 2020-02-05

## 2019-07-15 NOTE — ASSESSMENT & PLAN NOTE
Blood pressure today is elevated, as is the last several blood pressures  Recommend adding Ace inhibitor, lisinopril  I would strongly recommend that he have laboratory studies done, as well as the MRI of the brain and the carotid artery ultrasound  Follow up after that  Patient also does need blood work done in the near future for [facility] as was recommended by Neurology

## 2019-07-15 NOTE — ASSESSMENT & PLAN NOTE
EKG did show right bundle branch block, but no other abnormalities today  Follow-up as scheduled in the future  Consider Cardiology

## 2019-07-15 NOTE — PATIENT INSTRUCTIONS
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 ([facility] [address] ) - Primary     Check MRI as per Neurology  Elevated prostate specific antigen (PSA)     Patient did do a score from a magazine  It appears to be an AUA score  It is 12  He is mostly on satisfied with current status  He does have an appointment with Urology in September, so I would ask that he speak with them about it at that point  Medications would be reasonable form to consider  Hyperlipidemia     Check labs in future  Not on medications  Relevant Orders    Comprehensive metabolic panel    Hypertension     Blood pressure today is elevated, as is the last several blood pressures  Recommend adding Ace inhibitor, lisinopril  I would strongly recommend that he have laboratory studies done, as well as the MRI of the brain and the carotid artery ultrasound  Follow up after that  Patient also does need blood work done in the near future for [facility] as was recommended by Neurology  Relevant Medications    lisinopril (ZESTRIL) 5 mg tablet    Other Relevant Orders    CBC and differential    Comprehensive metabolic panel    POCT ECG (Completed)    Hypothyroidism     Patient does have hypothyroidism  Currently on Synthroid  No recent check  Check labs  RBBB (right bundle branch block)     EKG did show right bundle branch block, but no other abnormalities today  Follow-up as scheduled in the future  Consider Cardiology  Spasticity     Continues with changes  Reviewed most recent neuro note  They are recommending that the patient continue to have further evaluation           Relevant Orders    Comprehensive metabolic panel    White matter disease     Noted on prior CT  Recommend MRI as was recommended by Neurology  Carotid also recommended           Relevant Orders    Comprehensive metabolic panel      Other Visit Diagnoses     Screen for colon cancer        Relevant Orders    Ambulatory referral to Gastroenterology

## 2019-07-15 NOTE — ASSESSMENT & PLAN NOTE
Patient did do a score from a magazine  It appears to be an AUA score  It is 12  He is mostly on satisfied with current status  He does have an appointment with Urology in September, so I would ask that he speak with them about it at that point  Medications would be reasonable form to consider

## 2019-07-15 NOTE — ASSESSMENT & PLAN NOTE
Continues with changes  Reviewed most recent neuro note  They are recommending that the patient continue to have further evaluation

## 2019-07-15 NOTE — PROGRESS NOTES
Assessment and Plan:    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 ([facility] [address] ) - Primary     Check MRI as per Neurology  Elevated prostate specific antigen (PSA)     Patient did do a score from a magazine  It appears to be an AUA score  It is 12  He is mostly on satisfied with current status  He does have an appointment with Urology in September, so I would ask that he speak with them about it at that point  Medications would be reasonable form to consider  Hyperlipidemia     Check labs in future  Not on medications  Relevant Orders    Comprehensive metabolic panel    Hypertension     Blood pressure today is elevated, as is the last several blood pressures  Recommend adding Ace inhibitor, lisinopril  I would strongly recommend that he have laboratory studies done, as well as the MRI of the brain and the carotid artery ultrasound  Follow up after that  Patient also does need blood work done in the near future for [facility] as was recommended by Neurology  Relevant Medications    lisinopril (ZESTRIL) 5 mg tablet    Other Relevant Orders    CBC and differential    Comprehensive metabolic panel    POCT ECG (Completed)    Hypothyroidism     Patient does have hypothyroidism  Currently on Synthroid  No recent check  Check labs  RBBB (right bundle branch block)     EKG did show right bundle branch block, but no other abnormalities today  Follow-up as scheduled in the future  Consider Cardiology  Spasticity     Continues with changes  Reviewed most recent neuro note  They are recommending that the patient continue to have further evaluation           Relevant Orders Comprehensive metabolic panel    White matter disease     Noted on prior CT  Recommend MRI as was recommended by Neurology  Carotid also recommended  Relevant Orders    Comprehensive metabolic panel      Other Visit Diagnoses     Screen for colon cancer        Relevant Orders    Ambulatory referral to Gastroenterology                 Diagnoses and all orders for this visit:    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 ([facility] [address] )    Mixed hyperlipidemia  -     Comprehensive metabolic panel; Future    White matter disease  -     Comprehensive metabolic panel; Future    Amnestic MCI (mild cognitive impairment with memory loss)  -     Comprehensive metabolic panel; Future    Elevated prostate specific antigen (PSA)    Acquired hypothyroidism    Spasticity  -     Comprehensive metabolic panel; Future    Essential hypertension  -     CBC and differential; Future  -     Comprehensive metabolic panel; Future  -     POCT ECG  -     lisinopril (ZESTRIL) 5 mg tablet; Take 1 tablet (5 mg total) by mouth daily for 180 days    RBBB (right bundle branch block)    Screen for colon cancer  -     Ambulatory referral to Gastroenterology; Future              Subjective:      Patient ID: Sagar Mercedes is a [de-identified] y o  male  CC:    Chief Complaint   Patient presents with    Follow-up     6 month follow up to chronic conditions  mjs       HPI:    Patient reports he has only one issue  He states he has some issues with urination  Nocturia  1-2 hours after getting up at night, he has to go again  He states he has an appointment coming up in Sept     He did mention that his memory has been more problematic lately        The following portions of the patient's history were reviewed and updated as appropriate: allergies, current medications, past family history, past medical history, past social history, past surgical history and problem list       Review of Systems   Constitutional: Negative  HENT: Negative  Eyes: Negative  Respiratory: Negative  Cardiovascular: Negative  Gastrointestinal: Negative  Endocrine: Negative  Genitourinary: Negative  Musculoskeletal: Negative  Skin: Negative  Allergic/Immunologic: Negative  Neurological: Positive for tremors and speech difficulty  Memory loss   Hematological: Negative  Psychiatric/Behavioral: Negative  Data to review:       Objective:    Vitals:    07/15/19 0925 07/15/19 0928 07/15/19 0956   BP: (!) 180/100 (!) 182/104 150/80   Pulse: 84     Weight: 74 4 kg (164 lb)     Height: 5' 7 5" (1 715 m)          Physical Exam   Constitutional: He appears well-developed and well-nourished  HENT:   Head: Normocephalic and atraumatic  Neck: Normal range of motion  Neck supple  Cardiovascular: Normal rate, regular rhythm and normal heart sounds  Exam reveals no gallop and no friction rub  No murmur heard  Pulses:       Carotid pulses are 2+ on the right side, and 2+ on the left side  Pulmonary/Chest: Effort normal and breath sounds normal  No respiratory distress  He has no wheezes  He has no rales  Nursing note and vitals reviewed

## 2019-09-16 DIAGNOSIS — E03.9 ACQUIRED HYPOTHYROIDISM: ICD-10-CM

## 2019-09-16 RX ORDER — LEVOTHYROXINE SODIUM 0.07 MG/1
TABLET ORAL
Qty: 90 TABLET | Refills: 3 | Status: SHIPPED | OUTPATIENT
Start: 2019-09-16 | End: 2020-09-01

## 2020-02-05 DIAGNOSIS — I10 ESSENTIAL HYPERTENSION: ICD-10-CM

## 2020-02-05 RX ORDER — LISINOPRIL 5 MG/1
TABLET ORAL
Qty: 30 TABLET | Refills: 2 | Status: SHIPPED | OUTPATIENT
Start: 2020-02-05 | End: 2020-09-01

## 2020-08-26 ENCOUNTER — TELEPHONE (OUTPATIENT)
Dept: FAMILY MEDICINE CLINIC | Facility: CLINIC | Age: 80
End: 2020-08-26

## 2020-08-26 NOTE — TELEPHONE ENCOUNTER
Patients son Елена Smith stopped by he wanted to know how he can go about knowing what is going on with his father  He is forgetting a lot of things and Елена Smith doesn't feel he is telling them everything that is going on with his health  He doesn't feel the father will allow him to go to the appointment   He wanted to know if his mother could have him on  Her speaker phone when he is in his appointment on 9/1/2020, Please call Елена Smith at [phone] He is on the communication consent form

## 2020-08-27 NOTE — TELEPHONE ENCOUNTER
I would have absolutely no problems with sister being available on the visit  I would agree that if he is having more problems with memory, it would be reasonable to consider eliminating driving privileges  As far as alcohol is concerned, it would likely cause more problems  We can discuss at the next OV

## 2020-08-27 NOTE — TELEPHONE ENCOUNTER
Spoke with son, Diego Alvarado and he is concerned with Pt's mental decline  He feels pt should no longer be driving and should not be using alcohol  He does understand that the best approach would be for him to be present during the office visit so he could bring these concerns up  He is not sure his dad will agree to this  If he does agree, Diego Alvarado wants to know if the conversation can be recorded or if he can have his sister on cell phone so she could participate too  Pt's appt is 9/1/2020  Please review and advise  As a side note, Diego Alvarado also states his mother will also be present at the appt

## 2020-08-28 ENCOUNTER — TELEPHONE (OUTPATIENT)
Dept: FAMILY MEDICINE CLINIC | Facility: CLINIC | Age: 80
End: 2020-08-28

## 2020-08-28 NOTE — TELEPHONE ENCOUNTER
Called pt's son Jaki Kinney back and informed him of TH response to his request  Jaki Kinney asked me to go over our covid  Policy for visitors accompanying pts for their visit  Jaki Kinney is ok with his sister being in attendance for the visit via cell phone

## 2020-08-28 NOTE — TELEPHONE ENCOUNTER
Pt's daughter called asking if it would be ok for her to be in speaker phone during pt's visit on 9/1, she has some questions she wants to talk to [facility] about

## 2020-09-01 ENCOUNTER — OFFICE VISIT (OUTPATIENT)
Dept: FAMILY MEDICINE CLINIC | Facility: CLINIC | Age: 80
End: 2020-09-01
Payer: COMMERCIAL

## 2020-09-01 VITALS
BODY MASS INDEX: 26.3 KG/M2 | WEIGHT: 173.5 LBS | DIASTOLIC BLOOD PRESSURE: 84 MMHG | SYSTOLIC BLOOD PRESSURE: 148 MMHG | HEIGHT: 68 IN | HEART RATE: 84 BPM | TEMPERATURE: 98.2 F

## 2020-09-01 DIAGNOSIS — R90.82 WHITE MATTER DISEASE: ICD-10-CM

## 2020-09-01 DIAGNOSIS — R97.20 ELEVATED PROSTATE SPECIFIC ANTIGEN (PSA): ICD-10-CM

## 2020-09-01 DIAGNOSIS — E53.8 B12 DEFICIENCY: ICD-10-CM

## 2020-09-01 DIAGNOSIS — E03.9 ACQUIRED HYPOTHYROIDISM: ICD-10-CM

## 2020-09-01 DIAGNOSIS — R41.3 MEMORY LOSS OR IMPAIRMENT: ICD-10-CM

## 2020-09-01 DIAGNOSIS — I63.313 CEREBROVASCULAR ACCIDENT (CVA) DUE TO BILATERAL THROMBOSIS OF MIDDLE CEREBRAL ARTERIES (HCC): Primary | ICD-10-CM

## 2020-09-01 DIAGNOSIS — G31.84 AMNESTIC MCI (MILD COGNITIVE IMPAIRMENT WITH MEMORY LOSS): ICD-10-CM

## 2020-09-01 DIAGNOSIS — E78.2 MIXED HYPERLIPIDEMIA: ICD-10-CM

## 2020-09-01 DIAGNOSIS — I10 ESSENTIAL HYPERTENSION: ICD-10-CM

## 2020-09-01 PROCEDURE — 3288F FALL RISK ASSESSMENT DOCD: CPT | Performed by: FAMILY MEDICINE

## 2020-09-01 PROCEDURE — 1101F PT FALLS ASSESS-DOCD LE1/YR: CPT | Performed by: FAMILY MEDICINE

## 2020-09-01 PROCEDURE — 99215 OFFICE O/P EST HI 40 MIN: CPT | Performed by: FAMILY MEDICINE

## 2020-09-01 PROCEDURE — 3725F SCREEN DEPRESSION PERFORMED: CPT | Performed by: FAMILY MEDICINE

## 2020-09-01 NOTE — ASSESSMENT & PLAN NOTE
Patient does have some issues with regard to memory loss  It was recommended by Neurology previously to have a MRI with neuro quant  Would recommend that he have that done in the near future  Also trying limit risk factors for further CVA  This would include limiting alcohol

## 2020-09-01 NOTE — ASSESSMENT & PLAN NOTE
Last cholesterol was slightly elevated, especially given the risks that he currently has  Will need to re-evaluate once he recheck blood work  Consider further options  Statins may not be a reasonable option given his advanced age  Will defer to geriatrics

## 2020-09-01 NOTE — ASSESSMENT & PLAN NOTE
Blood pressure today slightly elevated  Recommend check labs, and then consider restarting lisinopril  He has not been on that in quite some time  He ran out, then did not know that he needed to have it, and was unsure what it was

## 2020-09-01 NOTE — PROGRESS NOTES
Assessment and Plan:    Problem List Items Addressed This Visit     Amnestic MCI (mild cognitive impairment with memory loss)     Patient does have some issues with regard to memory loss  It was recommended by Neurology previously to have a MRI with neuro quant  Would recommend that he have that done in the near future  Also trying limit risk factors for further CVA  This would include limiting alcohol  Relevant Orders    Ambulatory referral to Geriatrics    Comprehensive metabolic panel    Lipid Panel with Direct LDL reflex    TSH, 3rd generation    RPR    Vitamin B12    Folate    B12 deficiency     Patient's B12 level was low in the past   He has been on B12 in the past, but I would recommend that we recheck that  Has been quite a while since his last          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 ([facility] [address] ) - Primary     CVA noted previously  Recommend MRI as per Neurology visit in 2019  He has not had any further follow-up on that  Ask to reprinted the order, and then re-evaluate after the MRI  Relevant Orders    Ambulatory referral to Geriatrics    Comprehensive metabolic panel    Lipid Panel with Direct LDL reflex    TSH, 3rd generation    Elevated prostate specific antigen (PSA)     PSA is slightly elevated before  Would recommend recheck  Relevant Orders    PSA, Total Screen    Hyperlipidemia     Last cholesterol was slightly elevated, especially given the risks that he currently has  Will need to re-evaluate once he recheck blood work  Consider further options  Statins may not be a reasonable option given his advanced age  Will defer to geriatrics  Relevant Orders    Comprehensive metabolic panel    Lipid Panel with Direct LDL reflex    Hypertension     Blood pressure today slightly elevated  Recommend check labs, and then consider restarting lisinopril  He has not been on that in quite some time    He ran out, then did not know that he needed to have it, and was unsure what it was  Relevant Orders    CBC and differential    Comprehensive metabolic panel    TSH, 3rd generation    Hypothyroidism     Patient has been off Synthroid for quite some time now  Would recommend recheck, and then replace as necessary  This will certainly cause more problems for him  I do not think that explains everything, however  There certainly more problems than just hypothyroidism  Relevant Orders    TSH, 3rd generation    Memory loss or impairment     Patient has had small lacunar infarcts in the past, as well as 1 larger infarct based on the report from Neurology  At this point, 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  Would recommend follow-up with Geriatrics for their input with this, but at this time it would be geared at limiting risk factors for heart disease and CVA  This would include keeping his blood pressure under control, blood sugar, cholesterol, limiting external chemical irritants such as alcohol  At this point, I am also concerned about driving  He can certainly participate in a driving evaluation, but if he does not wish to do this I would recommend that he not drive at this point  Relevant Orders    Ambulatory referral to Geriatrics    Comprehensive metabolic panel    TSH, 3rd generation    RPR    Vitamin B12    Folate    White matter disease     Per discussion memory loss  Multiple small strokes  Risk factor modification  Relevant Orders    Ambulatory referral to Geriatrics    CBC and differential    Comprehensive metabolic panel    TSH, 3rd generation    RPR    Vitamin B12    Folate                 Diagnoses and all orders for this visit:    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 ([facility] [address] )  -     Ambulatory referral to Geriatrics; Future  -     Comprehensive metabolic panel;  Future  -     Lipid Panel with Direct LDL reflex; Future  -     TSH, 3rd generation; Future    Amnestic MCI (mild cognitive impairment with memory loss)  -     Ambulatory referral to Geriatrics; Future  -     Comprehensive metabolic panel; Future  -     Lipid Panel with Direct LDL reflex; Future  -     TSH, 3rd generation; Future  -     RPR; Future  -     Vitamin B12; Future  -     Folate; Future    Memory loss or impairment  -     Ambulatory referral to Geriatrics; Future  -     Comprehensive metabolic panel; Future  -     TSH, 3rd generation; Future  -     RPR; Future  -     Vitamin B12; Future  -     Folate; Future    White matter disease  -     Ambulatory referral to Geriatrics; Future  -     CBC and differential; Future  -     Comprehensive metabolic panel; Future  -     TSH, 3rd generation; Future  -     RPR; Future  -     Vitamin B12; Future  -     Folate; Future    B12 deficiency    Elevated prostate specific antigen (PSA)  -     PSA, Total Screen; Future    Essential hypertension  -     CBC and differential; Future  -     Comprehensive metabolic panel; Future  -     TSH, 3rd generation; Future    Mixed hyperlipidemia  -     Comprehensive metabolic panel; Future  -     Lipid Panel with Direct LDL reflex; Future    Acquired hypothyroidism  -     TSH, 3rd generation; Future              Subjective:      Patient ID: Tatiana Perez is a 79 y o  male  CC:    Chief Complaint   Patient presents with    Follow-up     He was brought here by son Rajan Nance  Family is concerned about memory issues  mjs       HPI:    Patient here with his son and daughter  Daughter is here by phone  He is not doing well with memory  He has not been seen for a while  He was concerned about Levothyroxine  He reports he is not on it for a while  Family reports main issue is memory  In the last year it is worse  Had been a slow decline before  Is worse now  1: When he has alcohol, he is a bit more angry when he has some, and then has loss of memory of this      2: Family is concerned about him driving  He has not had an accident, but he does drive currently  Behavior changes were noted by his daughter at a recent visit to her home in [place]  He was not drinking a lot of water, and was having worse behavior as the day progressed  Some behaviors were OK, including a game (ladderball)  Has not been on Lisinopril, nor levothyroxine  The following portions of the patient's history were reviewed and updated as appropriate: allergies, current medications, past family history, past medical history, past social history, past surgical history and problem list       Review of Systems   Constitutional: Negative  HENT: Negative  Eyes: Negative  Respiratory: Negative  Cardiovascular: Negative  Gastrointestinal: Negative  Endocrine: Negative  Genitourinary: Negative  Musculoskeletal: Negative  Skin: Negative  Allergic/Immunologic: Negative  Neurological: Negative  Hematological: Negative  Psychiatric/Behavioral: Negative  Data to review:       Objective:    Vitals:    09/01/20 1632   BP: 148/84   Pulse: 84   Temp: 98 2 °F (36 8 °C)   Weight: 78 7 kg (173 lb 8 oz)   Height: 5' 7 5" (1 715 m)        Physical Exam  Vitals signs and nursing note reviewed  Constitutional:       Appearance: Normal appearance  Neck:      Vascular: No carotid bruit  Cardiovascular:      Rate and Rhythm: Normal rate and regular rhythm  Pulses: Normal pulses  Carotid pulses are 2+ on the right side and 2+ on the left side  Heart sounds: Normal heart sounds  No murmur  No gallop  Pulmonary:      Effort: Pulmonary effort is normal  No respiratory distress  Breath sounds: Normal breath sounds  No stridor  No wheezing, rhonchi or rales  Chest:      Chest wall: No tenderness  Neurological:      Mental Status: He is alert  [facility] Mental Status Exam (SLUMS)    Florence Pouch     79 y o     Education level:  High School? yes     Less than High School?   no  For the questions below, yes is correct response, no is incorrect  1: What day of the week is it?  yes    1  2: What year is it?   no    1  3: What state are we in?  yes    1    Total points: 2 / 3  Please remember these 5 objects  I will ask about them later  Apple Table [address]    5: You have $100 and you go to the store and buy a dozen apples for $3, and a tricycle for $20    How much did you spend?   56    1   How much do you have left? 44    2    Total points: 2 / 3     6: Please name as many animals as you can in 1 minute   Number named:  8   0: 0-4 1: 5-9 2: 10-14   3: 15+    Total points: 1 / 3     7: What were the five objects I asked you to remember? Number remembered: I cant do that  1 point for each correct object remembered    8: I am going to give you a series of numbers  I want you to give them to me backwards,    Example: If I say 42, you say 24    0: 87  1: 649  1: 8537 Points: 2    9: This is a clock face  Please put hour markers and the time at ten minutes to eleven o'clock  2: Hour markers okay       2: Time correct  Total points: 2 / 4  Patient had significant trouble with determining time  10: Place an X in the triangle  (Square Triangle Rectangle)    1  Which figure is largest?      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 2: What state did she live in? Total points: 6 / 8      Final Total:  17 / 30        Scoring:   High school education    Less than high school education  27-30    Normal   20-30  20-27    MCI   14-19  1-19    Dementia  1-14        BMI Counseling: Body mass index is 26 77 kg/m²  Follow-up plan was not completed due to elderly patient (71 years old) where weight reduction/weight gain would complicate underlying health condition such as: mental illness, dementia, or confusion

## 2020-09-01 NOTE — ASSESSMENT & PLAN NOTE
Patient has been off Synthroid for quite some time now  Would recommend recheck, and then replace as necessary  This will certainly cause more problems for him  I do not think that explains everything, however  There certainly more problems than just hypothyroidism

## 2020-09-01 NOTE — ASSESSMENT & PLAN NOTE
Patient's B12 level was low in the past   He has been on B12 in the past, but I would recommend that we recheck that    Has been quite a while since his last

## 2020-09-01 NOTE — ASSESSMENT & PLAN NOTE
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

## 2020-09-01 NOTE — ASSESSMENT & PLAN NOTE
CVA noted previously  Recommend MRI as per Neurology visit in 2019  He has not had any further follow-up on that  Ask to reprinted the order, and then re-evaluate after the MRI

## 2020-09-02 ENCOUNTER — TELEPHONE (OUTPATIENT)
Dept: GERIATRICS | Age: 80
End: 2020-09-02

## 2020-09-02 ENCOUNTER — TELEPHONE (OUTPATIENT)
Dept: NEUROLOGY | Facility: CLINIC | Age: 80
End: 2020-09-02

## 2020-09-02 DIAGNOSIS — I63.313 CEREBROVASCULAR ACCIDENT (CVA) DUE TO BILATERAL THROMBOSIS OF MIDDLE CEREBRAL ARTERIES (HCC): ICD-10-CM

## 2020-09-02 DIAGNOSIS — R41.3 MEMORY LOSS OR IMPAIRMENT: Primary | ICD-10-CM

## 2020-09-02 NOTE — TELEPHONE ENCOUNTER
[facility]  [address] [place] [address]  [phone]  Telephone Intake Geriatric Assessment     Referral Source: PCP                            Patients PCP: Caitlyn BOOTHE  Caller (and relationship to patient): Patient's primary physician's office Vilma Tristan [phone])      (relationship to patient): Ronald Barajas Person Phone Number: [phone]              Is caller POA? No      Reason for referral: Memory Loss     Others residing with patient: spouse     Has the patient ever been formally tested for memory/dementia? No     Has the patient ever been diagnosed with dementia? No     Has the patient been seen by a Neurologist?  Yes ([facility] approx 1/5 years ago)     What is the goal of visit? Evaluated, diagnose, support for care giver  Preferred language? English    Highest education level? 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 Yes     Does the patient wear glasses? Yes     Does the patient use hearing aids? No     First Visit: 10/20 @ [address] [facility]    Conference Visit: 11/10 @ 4p    Office packet mailed out: Yes     In office visit and family conference video visit process explained:  Yes

## 2020-09-02 NOTE — TELEPHONE ENCOUNTER
Left message on voicemail for patient's son, Angeles Hall, to contact office to schedule Hui Assess & Conference per referral from PCP office

## 2020-09-02 NOTE — TELEPHONE ENCOUNTER
Mri neuroquant order is in - please help scheduling, might be an issue, as patient was seen 1 5 years ago  No contrast required     If office visit required - please offer evaluation by AP first

## 2020-09-02 NOTE — TELEPHONE ENCOUNTER
Patient's wife calling in  She states patient saw PCP yesterday and recommended to have MRI completed  She states she tried to schedule MRI but unable to do so and needs new order for MRI  This was ordered in 2019  Patient has not been seen since  She is requesting new order to be placed as the old one   Please enter new order if agreeable   Ty!    [phone]   Alexandria Neela to leave a detailed message

## 2020-09-02 NOTE — TELEPHONE ENCOUNTER
Called and spoke to wife  Made her aware of below  She states "I am trying to limit appts  I just want to schedule the MRI " I made her aware it may require a PA and insurance may not accept notes from over 1 year  She states "use PCPs note from yesterday " I stated if you want PCP's notes to be used, then PCP needs to order imagine  I spoke to Hyun Chavez in precert and she states she can't see if it needs a PA until imaging is scheduled  I made branden aware and transferred call to  to schedule this

## 2020-09-14 ENCOUNTER — HOSPITAL ENCOUNTER (OUTPATIENT)
Dept: MRI IMAGING | Facility: HOSPITAL | Age: 80
Discharge: HOME | End: 2020-09-14
Attending: PSYCHIATRY & NEUROLOGY
Payer: COMMERCIAL

## 2020-09-14 DIAGNOSIS — I63.313 CEREBROVASCULAR ACCIDENT (CVA) DUE TO BILATERAL THROMBOSIS OF MIDDLE CEREBRAL ARTERIES (HCC): ICD-10-CM

## 2020-09-14 DIAGNOSIS — R41.3 MEMORY LOSS OR IMPAIRMENT: ICD-10-CM

## 2020-09-14 PROCEDURE — G1004 CDSM NDSC: HCPCS

## 2020-09-14 PROCEDURE — 70551 MRI BRAIN STEM W/O DYE: CPT

## 2020-10-01 ENCOUNTER — OFFICE VISIT (OUTPATIENT)
Dept: FAMILY MEDICINE CLINIC | Facility: CLINIC | Age: 80
End: 2020-10-01
Payer: COMMERCIAL

## 2020-10-01 VITALS
HEIGHT: 68 IN | HEART RATE: 88 BPM | DIASTOLIC BLOOD PRESSURE: 100 MMHG | BODY MASS INDEX: 25.61 KG/M2 | WEIGHT: 169 LBS | SYSTOLIC BLOOD PRESSURE: 142 MMHG | TEMPERATURE: 97.6 F

## 2020-10-01 DIAGNOSIS — M19.90 ARTHRITIS: ICD-10-CM

## 2020-10-01 DIAGNOSIS — I63.313 CEREBROVASCULAR ACCIDENT (CVA) DUE TO BILATERAL THROMBOSIS OF MIDDLE CEREBRAL ARTERIES (HCC): ICD-10-CM

## 2020-10-01 DIAGNOSIS — R97.20 ELEVATED PROSTATE SPECIFIC ANTIGEN (PSA): ICD-10-CM

## 2020-10-01 DIAGNOSIS — G31.84 AMNESTIC MCI (MILD COGNITIVE IMPAIRMENT WITH MEMORY LOSS): Primary | ICD-10-CM

## 2020-10-01 DIAGNOSIS — E78.2 MIXED HYPERLIPIDEMIA: ICD-10-CM

## 2020-10-01 DIAGNOSIS — I10 ESSENTIAL HYPERTENSION: ICD-10-CM

## 2020-10-01 PROCEDURE — 99214 OFFICE O/P EST MOD 30 MIN: CPT | Performed by: FAMILY MEDICINE

## 2020-10-08 ENCOUNTER — APPOINTMENT (OUTPATIENT)
Dept: RADIOLOGY | Facility: CLINIC | Age: 80
End: 2020-10-08
Payer: COMMERCIAL

## 2020-10-08 DIAGNOSIS — M19.90 ARTHRITIS: ICD-10-CM

## 2020-10-08 PROCEDURE — 73030 X-RAY EXAM OF SHOULDER: CPT

## 2020-10-15 ENCOUNTER — TELEPHONE (OUTPATIENT)
Dept: FAMILY MEDICINE CLINIC | Facility: CLINIC | Age: 80
End: 2020-10-15

## 2020-10-15 DIAGNOSIS — R97.20 ELEVATED PROSTATE SPECIFIC ANTIGEN (PSA): Primary | ICD-10-CM

## 2020-10-15 DIAGNOSIS — E03.9 ACQUIRED HYPOTHYROIDISM: ICD-10-CM

## 2020-10-20 ENCOUNTER — CONSULT (OUTPATIENT)
Dept: GERIATRICS | Age: 80
End: 2020-10-20
Payer: COMMERCIAL

## 2020-10-20 VITALS
TEMPERATURE: 97.1 F | RESPIRATION RATE: 18 BRPM | HEIGHT: 67 IN | DIASTOLIC BLOOD PRESSURE: 80 MMHG | WEIGHT: 168.8 LBS | HEART RATE: 85 BPM | BODY MASS INDEX: 26.49 KG/M2 | OXYGEN SATURATION: 97 % | SYSTOLIC BLOOD PRESSURE: 140 MMHG

## 2020-10-20 DIAGNOSIS — D72.819 LEUKOPENIA, UNSPECIFIED TYPE: ICD-10-CM

## 2020-10-20 DIAGNOSIS — E53.8 B12 DEFICIENCY: ICD-10-CM

## 2020-10-20 DIAGNOSIS — E78.2 MIXED HYPERLIPIDEMIA: ICD-10-CM

## 2020-10-20 DIAGNOSIS — G47.10 SLEEPING EXCESSIVE: ICD-10-CM

## 2020-10-20 DIAGNOSIS — F03.90 DEMENTIA WITHOUT BEHAVIORAL DISTURBANCE, UNSPECIFIED DEMENTIA TYPE (HCC): Primary | ICD-10-CM

## 2020-10-20 DIAGNOSIS — R26.81 GAIT INSTABILITY: ICD-10-CM

## 2020-10-20 DIAGNOSIS — M19.90 ARTHRITIS: ICD-10-CM

## 2020-10-20 DIAGNOSIS — I63.313 CEREBROVASCULAR ACCIDENT (CVA) DUE TO BILATERAL THROMBOSIS OF MIDDLE CEREBRAL ARTERIES (HCC): ICD-10-CM

## 2020-10-20 DIAGNOSIS — I10 ESSENTIAL HYPERTENSION: ICD-10-CM

## 2020-10-20 PROCEDURE — 3077F SYST BP >= 140 MM HG: CPT | Performed by: STUDENT IN AN ORGANIZED HEALTH CARE EDUCATION/TRAINING PROGRAM

## 2020-10-20 PROCEDURE — 1036F TOBACCO NON-USER: CPT | Performed by: STUDENT IN AN ORGANIZED HEALTH CARE EDUCATION/TRAINING PROGRAM

## 2020-10-20 PROCEDURE — 99205 OFFICE O/P NEW HI 60 MIN: CPT | Performed by: STUDENT IN AN ORGANIZED HEALTH CARE EDUCATION/TRAINING PROGRAM

## 2020-10-20 PROCEDURE — 1160F RVW MEDS BY RX/DR IN RCRD: CPT | Performed by: STUDENT IN AN ORGANIZED HEALTH CARE EDUCATION/TRAINING PROGRAM

## 2020-10-20 PROCEDURE — 3079F DIAST BP 80-89 MM HG: CPT | Performed by: STUDENT IN AN ORGANIZED HEALTH CARE EDUCATION/TRAINING PROGRAM

## 2020-10-29 DIAGNOSIS — Z11.1 SCREENING FOR TUBERCULOSIS: Primary | ICD-10-CM

## 2020-10-30 ENCOUNTER — OFFICE VISIT (OUTPATIENT)
Dept: GERIATRICS | Age: 80
End: 2020-10-30
Payer: COMMERCIAL

## 2020-10-30 ENCOUNTER — APPOINTMENT (OUTPATIENT)
Dept: LAB | Facility: CLINIC | Age: 80
End: 2020-10-30
Payer: COMMERCIAL

## 2020-10-30 DIAGNOSIS — F03.90 DEMENTIA WITHOUT BEHAVIORAL DISTURBANCE, UNSPECIFIED DEMENTIA TYPE (HCC): ICD-10-CM

## 2020-10-30 DIAGNOSIS — Z11.1 SCREENING FOR TUBERCULOSIS: ICD-10-CM

## 2020-10-30 LAB
T4 FREE SERPL-MCNC: 0.91 NG/DL (ref 0.76–1.46)
TSH SERPL DL<=0.05 MIU/L-ACNC: 4.85 UIU/ML (ref 0.36–3.74)

## 2020-10-30 PROCEDURE — 86480 TB TEST CELL IMMUN MEASURE: CPT

## 2020-10-30 PROCEDURE — 84439 ASSAY OF FREE THYROXINE: CPT

## 2020-10-30 PROCEDURE — 96138 PSYCL/NRPSYC TECH 1ST: CPT | Performed by: STUDENT IN AN ORGANIZED HEALTH CARE EDUCATION/TRAINING PROGRAM

## 2020-10-30 PROCEDURE — 36415 COLL VENOUS BLD VENIPUNCTURE: CPT

## 2020-10-30 PROCEDURE — 96139 PSYCL/NRPSYC TST TECH EA: CPT | Performed by: STUDENT IN AN ORGANIZED HEALTH CARE EDUCATION/TRAINING PROGRAM

## 2020-10-30 PROCEDURE — 84443 ASSAY THYROID STIM HORMONE: CPT

## 2020-11-02 LAB
GAMMA INTERFERON BACKGROUND BLD IA-ACNC: 0.03 IU/ML
M TB IFN-G BLD-IMP: NEGATIVE
M TB IFN-G CD4+ BCKGRND COR BLD-ACNC: 0 IU/ML
M TB IFN-G CD4+ BCKGRND COR BLD-ACNC: 0 IU/ML
MITOGEN IGNF BCKGRD COR BLD-ACNC: >10 IU/ML

## 2020-11-09 ENCOUNTER — IMMUNIZATIONS (OUTPATIENT)
Dept: FAMILY MEDICINE CLINIC | Facility: CLINIC | Age: 80
End: 2020-11-09
Payer: COMMERCIAL

## 2020-11-09 DIAGNOSIS — Z23 ENCOUNTER FOR IMMUNIZATION: ICD-10-CM

## 2020-11-09 PROCEDURE — G0008 ADMIN INFLUENZA VIRUS VAC: HCPCS | Performed by: PHYSICIAN ASSISTANT

## 2020-11-09 PROCEDURE — 90662 IIV NO PRSV INCREASED AG IM: CPT | Performed by: PHYSICIAN ASSISTANT

## 2020-11-10 ENCOUNTER — OFFICE VISIT (OUTPATIENT)
Dept: GERIATRICS | Age: 80
End: 2020-11-10
Payer: COMMERCIAL

## 2020-11-10 ENCOUNTER — TELEPHONE (OUTPATIENT)
Dept: GERIATRICS | Age: 80
End: 2020-11-10

## 2020-11-10 VITALS
HEIGHT: 67 IN | BODY MASS INDEX: 26.65 KG/M2 | RESPIRATION RATE: 12 BRPM | HEART RATE: 85 BPM | TEMPERATURE: 97.8 F | OXYGEN SATURATION: 98 % | DIASTOLIC BLOOD PRESSURE: 86 MMHG | WEIGHT: 169.8 LBS | SYSTOLIC BLOOD PRESSURE: 152 MMHG

## 2020-11-10 DIAGNOSIS — R26.81 GAIT INSTABILITY: ICD-10-CM

## 2020-11-10 DIAGNOSIS — E78.2 MIXED HYPERLIPIDEMIA: ICD-10-CM

## 2020-11-10 DIAGNOSIS — F03.90 DEMENTIA WITHOUT BEHAVIORAL DISTURBANCE, UNSPECIFIED DEMENTIA TYPE (HCC): Primary | ICD-10-CM

## 2020-11-10 DIAGNOSIS — E03.9 ACQUIRED HYPOTHYROIDISM: ICD-10-CM

## 2020-11-10 DIAGNOSIS — I63.313 CEREBROVASCULAR ACCIDENT (CVA) DUE TO BILATERAL THROMBOSIS OF MIDDLE CEREBRAL ARTERIES (HCC): ICD-10-CM

## 2020-11-10 DIAGNOSIS — D72.819 LEUKOPENIA, UNSPECIFIED TYPE: ICD-10-CM

## 2020-11-10 DIAGNOSIS — I10 ESSENTIAL HYPERTENSION: ICD-10-CM

## 2020-11-10 DIAGNOSIS — G47.10 SLEEPING EXCESSIVE: ICD-10-CM

## 2020-11-10 PROCEDURE — 99215 OFFICE O/P EST HI 40 MIN: CPT | Performed by: STUDENT IN AN ORGANIZED HEALTH CARE EDUCATION/TRAINING PROGRAM

## 2020-11-10 PROCEDURE — 3079F DIAST BP 80-89 MM HG: CPT | Performed by: STUDENT IN AN ORGANIZED HEALTH CARE EDUCATION/TRAINING PROGRAM

## 2020-11-10 PROCEDURE — 1036F TOBACCO NON-USER: CPT | Performed by: STUDENT IN AN ORGANIZED HEALTH CARE EDUCATION/TRAINING PROGRAM

## 2020-11-10 PROCEDURE — 1160F RVW MEDS BY RX/DR IN RCRD: CPT | Performed by: STUDENT IN AN ORGANIZED HEALTH CARE EDUCATION/TRAINING PROGRAM

## 2020-11-10 PROCEDURE — 3077F SYST BP >= 140 MM HG: CPT | Performed by: STUDENT IN AN ORGANIZED HEALTH CARE EDUCATION/TRAINING PROGRAM

## 2021-01-13 ENCOUNTER — OFFICE VISIT (OUTPATIENT)
Dept: FAMILY MEDICINE CLINIC | Facility: CLINIC | Age: 81
End: 2021-01-13
Payer: COMMERCIAL

## 2021-01-13 VITALS
SYSTOLIC BLOOD PRESSURE: 118 MMHG | WEIGHT: 169.38 LBS | BODY MASS INDEX: 26.58 KG/M2 | DIASTOLIC BLOOD PRESSURE: 82 MMHG | HEART RATE: 80 BPM | HEIGHT: 67 IN | TEMPERATURE: 97.3 F

## 2021-01-13 DIAGNOSIS — I10 ESSENTIAL HYPERTENSION: ICD-10-CM

## 2021-01-13 DIAGNOSIS — R97.20 ELEVATED PROSTATE SPECIFIC ANTIGEN (PSA): ICD-10-CM

## 2021-01-13 DIAGNOSIS — E78.2 MIXED HYPERLIPIDEMIA: ICD-10-CM

## 2021-01-13 DIAGNOSIS — E03.9 ACQUIRED HYPOTHYROIDISM: ICD-10-CM

## 2021-01-13 DIAGNOSIS — I63.313 CEREBROVASCULAR ACCIDENT (CVA) DUE TO BILATERAL THROMBOSIS OF MIDDLE CEREBRAL ARTERIES (HCC): Primary | ICD-10-CM

## 2021-01-13 DIAGNOSIS — F03.90 DEMENTIA WITHOUT BEHAVIORAL DISTURBANCE, UNSPECIFIED DEMENTIA TYPE (HCC): ICD-10-CM

## 2021-01-13 DIAGNOSIS — R90.82 WHITE MATTER DISEASE: ICD-10-CM

## 2021-01-13 PROCEDURE — 1170F FXNL STATUS ASSESSED: CPT | Performed by: FAMILY MEDICINE

## 2021-01-13 PROCEDURE — 3079F DIAST BP 80-89 MM HG: CPT | Performed by: FAMILY MEDICINE

## 2021-01-13 PROCEDURE — 3725F SCREEN DEPRESSION PERFORMED: CPT | Performed by: FAMILY MEDICINE

## 2021-01-13 PROCEDURE — 1036F TOBACCO NON-USER: CPT | Performed by: FAMILY MEDICINE

## 2021-01-13 PROCEDURE — 3074F SYST BP LT 130 MM HG: CPT | Performed by: FAMILY MEDICINE

## 2021-01-13 PROCEDURE — 1101F PT FALLS ASSESS-DOCD LE1/YR: CPT | Performed by: FAMILY MEDICINE

## 2021-01-13 PROCEDURE — 1160F RVW MEDS BY RX/DR IN RCRD: CPT | Performed by: FAMILY MEDICINE

## 2021-01-13 PROCEDURE — 99214 OFFICE O/P EST MOD 30 MIN: CPT | Performed by: FAMILY MEDICINE

## 2021-01-13 PROCEDURE — 3288F FALL RISK ASSESSMENT DOCD: CPT | Performed by: FAMILY MEDICINE

## 2021-01-13 PROCEDURE — 1125F AMNT PAIN NOTED PAIN PRSNT: CPT | Performed by: FAMILY MEDICINE

## 2021-01-13 NOTE — ASSESSMENT & PLAN NOTE
Stable with minimal problems at the moment  Certainly, behavioral activities are quite different than what they were before, so there is long-term residual   Physically, he seems to be doing relatively well

## 2021-01-13 NOTE — ASSESSMENT & PLAN NOTE
Cholesterol is clearly been elevated in the past, but given his problems with statins, I would not recommend starting them  Also, at his age, statins are relatively contraindicated

## 2021-01-13 NOTE — ASSESSMENT & PLAN NOTE
Stable at the moment  Follow-up in the future  Goal would be to try and decrease further events, but unfortunately statin therapy is off the table at this point  Continue on fish oils

## 2021-01-13 NOTE — PROGRESS NOTES
Assessment and Plan:    Problem List Items Addressed This Visit     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 ([facility] [address] ) - Primary     Stable with minimal problems at the moment  Certainly, behavioral activities are quite different than what they were before, so there is long-term residual   Physically, he seems to be doing relatively well  Dementia ([facility] [address] )     Secondary to CVA and white matter disease  Follow-up as scheduled  Elevated prostate specific antigen (PSA)     Patient saw Urology  They recommended a 1 year repeat blood work and then digital rectal exam          Hyperlipidemia     Cholesterol is clearly been elevated in the past, but given his problems with statins, I would not recommend starting them  Also, at his age, statins are relatively contraindicated  Hypertension     Blood pressure doing well today  No changes  Hypothyroidism     Noted at 1 point  No changes recommended  White matter disease     Stable at the moment  Follow-up in the future  Goal would be to try and decrease further events, but unfortunately statin therapy is off the table at this point  Continue on fish oils  Diagnoses and all orders for this visit:    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 ([facility] [address] )    Dementia without behavioral disturbance, unspecified dementia type ([facility] [address] )    Elevated prostate specific antigen (PSA)    Essential hypertension    Acquired hypothyroidism    White matter disease    Mixed hyperlipidemia              Subjective:      Patient ID: Colonel Marie is a 79 y o  male  CC:    Chief Complaint   Patient presents with    Follow-up     for chronic conditions  mgb       HPI:    Patient is here to follow-up on multiple issues  I am hungry      Patient feels he is doing relatively well  Hypothyroidism:  Noted previously    At 1 point he was on Synthroid, but he has not been on it for a while  Last 2 TSH were slightly higher than goal     Hyperlipidemia:  Previously he was on simvastatin, but did have some significant problems with that  Currently, not having any medications for it, but also not necessarily paying attention to lifestyle modification as aggressively as he could  CVA:  Noted previously  Has been stable  Hypertension:  Not on medications for this  PSA in October was elevated  Patient did go to Urology in December  Reviewed via Care everywhere  Urology recommended repeat labs in 1 year, followed by physical exam       The following portions of the patient's history were reviewed and updated as appropriate: allergies, current medications, past family history, past medical history, past social history, past surgical history and problem list       Review of Systems   Constitutional: Negative  HENT: Negative  Eyes: Negative  Respiratory: Negative  Cardiovascular: Negative  Gastrointestinal: Negative  Endocrine: Negative  Genitourinary: Negative  Musculoskeletal: Negative  Skin: Negative  Allergic/Immunologic: Negative  Neurological: Negative  Hematological: Negative  Psychiatric/Behavioral: Negative  Data to review:       Objective:    Vitals:    01/13/21 1726   BP: 118/82   BP Location: Left arm   Patient Position: Sitting   Cuff Size: Large   Pulse: 80   Temp: (!) 97 3 °F (36 3 °C)   TempSrc: Temporal   Weight: 76 8 kg (169 lb 6 oz)   Height: 5' 7" (1 702 m)        Physical Exam  Vitals signs and nursing note reviewed  Constitutional:       Appearance: Normal appearance  Neck:      Vascular: No carotid bruit  Cardiovascular:      Rate and Rhythm: Normal rate and regular rhythm  Pulses: Normal pulses  Carotid pulses are 2+ on the right side and 2+ on the left side  Heart sounds: Normal heart sounds  No murmur  No gallop      Pulmonary:      Effort: Pulmonary effort is normal  No respiratory distress  Breath sounds: Normal breath sounds  No stridor  No wheezing, rhonchi or rales  Chest:      Chest wall: No tenderness  Neurological:      Mental Status: He is alert

## 2021-01-13 NOTE — PROGRESS NOTES
Assessment and Plan:     Problem List Items Addressed This Visit     None           Preventive health issues were discussed with patient, and age appropriate screening tests were ordered as noted in patient's After Visit Summary  Personalized health advice and appropriate referrals for health education or preventive services given if needed, as noted in patient's After Visit Summary       History of Present Illness:     Patient presents for Medicare Annual Wellness visit    Patient Care Team:  Severa Robson, MD as PCP - Bart Sadler MD as PCP - [address] [place], [place] [address] [facility] ([facility])     Problem List:     Patient Active Problem List   Diagnosis    Allergic rhinitis    Arthritis    Actinic keratosis    Elevated prostate specific antigen (PSA)    Erectile dysfunction of non-organic origin    External hemorrhoids    Gout    Hyperlipidemia    Hypothyroidism    Trigger finger    B12 deficiency    Spasticity    White matter disease    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 ([address] )    RBBB (right bundle branch block)    Hypertension    Dementia ([address] )    Leukopenia    Gait instability    Sleeping excessive    Screening for tuberculosis      Past Medical and Surgical History:     Past Medical History:   Diagnosis Date    Basal cell adenocarcinoma     right thigh     Past Surgical History:   Procedure Laterality Date    VASECTOMY        Family History:     Family History   Problem Relation Age of Onset    Alzheimer's disease Mother     Lymphoma Father       Social History:        Social History     Socioeconomic History    Marital status: /Civil Union     Spouse name: Not on file    Number of children: Not on file    Years of education: Not on file    Highest education level: Not on file   Occupational History    Occupation: retired   Social Needs    Financial resource strain: Not on file    Food insecurity     Worry: Not on file Inability: Not on file    Transportation needs     Medical: Not on file     Non-medical: Not on file   Tobacco Use    Smoking status: Never Smoker    Smokeless tobacco: Never Used   Substance and Sexual Activity    Alcohol use: Yes     Comment: occasional    Drug use: No    Sexual activity: Not on file   Lifestyle    Physical activity     Days per week: Not on file     Minutes per session: Not on file    Stress: Not on file   Relationships    Social connections     Talks on phone: Not on file     Gets together: Not on file     Attends Mu-ism service: Not on file     Active member of club or organization: Not on file     Attends meetings of clubs or organizations: Not on file     Relationship status: Not on file    Intimate partner violence     Fear of current or ex partner: Not on file     Emotionally abused: Not on file     Physically abused: Not on file     Forced sexual activity: Not on file   Other Topics Concern    Not on file   Social History Narrative    Recreational activities skiing      Medications and Allergies:     Current Outpatient Medications   Medication Sig Dispense Refill    Omega-3 Fatty Acids ([facility] FISH OIL) 1000 MG CAPS Take by mouth      vitamin B-12 (CYANOCOBALAMIN) 50 MCG TABS Take 1 tablet (50 mcg total) by mouth daily 90 tablet 3     No current facility-administered medications for this visit        Allergies   Allergen Reactions    Simvastatin Myalgia     Other reaction(s): elevated CPK, Rhabdomyolysis    Statins Myalgia     Other reaction(s): Unknown Reaction      Immunizations:     Immunization History   Administered Date(s) Administered    H1N1, All Formulations 01/08/2010    INFLUENZA 10/20/2016    Influenza Recombinant Preservative Free Im 10/09/2018    Influenza Split High Dose Preservative Free IM 12/10/2012, 09/25/2014, 10/01/2019    Influenza, high dose seasonal 0 7 mL 11/09/2020    Pneumococcal Polysaccharide PPV23 02/16/2005, 10/20/2016      Health Maintenance: There are no preventive care reminders to display for this patient  There are no preventive care reminders to display for this patient  Medicare Health Risk Assessment:     /82 (BP Location: Left arm, Patient Position: Sitting, Cuff Size: Large)   Pulse 80   Temp (!) 97 3 °F (36 3 °C) (Temporal)   Ht 5' 7" (1 702 m)   Wt 76 8 kg (169 lb 6 oz)   BMI 26 53 kg/m²      Juve Scott is here for his Subsequent Wellness visit  Health Risk Assessment:   Patient rates overall health as good  Patient feels that their physical health rating is same  Eyesight was rated as same  Hearing was rated as same  Patient feels that their emotional and mental health rating is slightly worse  Pain experienced in the last 7 days has been none  Patient states that he has experienced no weight loss or gain in last 6 months  Depression Screening:   PHQ-2 Score: 0      Fall Risk Screening: In the past year, patient has experienced: no history of falling in past year      Home Safety:  Patient does not have trouble with stairs inside or outside of their home  Patient has working smoke alarms and has working carbon monoxide detector  Home safety hazards include: none  Nutrition:   Current diet is Regular  Medications:   Patient is currently taking over-the-counter supplements  OTC medications include: see medication list  Patient is not able to manage medications  Activities of Daily Living (ADLs)/Instrumental Activities of Daily Living (IADLs):   Walk and transfer into and out of bed and chair?: Yes  Dress and groom yourself?: Yes    Bathe or shower yourself?: Yes    Feed yourself?  Yes  Do your laundry/housekeeping?: No  Manage your money, pay your bills and track your expenses?: No  Make your own meals?: No    Do your own shopping?: No    Previous Hospitalizations:   Any hospitalizations or ED visits within the last 12 months?: No      Advance Care Planning:   Living will: Yes    Durable POA for healthcare:  Yes    Advanced directive: Yes    Advanced directive counseling given: Yes    Five wishes given: No    Patient declined ACP directive: No      Cognitive Screening:   Provider or family/friend/caregiver concerned regarding cognition?: Yes    Cognition Comments: Dx with dementia      PREVENTIVE SCREENINGS      Cardiovascular Screening:    General: Screening Not Indicated and History Lipid Disorder      Diabetes Screening:     General: Screening Current      Colorectal Cancer Screening:     General: Screening Not Indicated      Prostate Cancer Screening:    General: Screening Not Indicated      Osteoporosis Screening:    General: Screening Not Indicated      Abdominal Aortic Aneurysm (AAA) Screening:        General: Screening Not Indicated      Lung Cancer Screening:     General: Screening Not Indicated      Hepatitis C Screening:    General: Screening Not Indicated      Segundo Bolivar MD

## 2021-01-13 NOTE — PATIENT INSTRUCTIONS
Problem List Items Addressed This Visit     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 ([address] ) - Primary     Stable with minimal problems at the moment  Certainly, behavioral activities are quite different than what they were before, so there is long-term residual   Physically, he seems to be doing relatively well  Dementia ([address] )     Secondary to CVA and white matter disease  Follow-up as scheduled  Elevated prostate specific antigen (PSA)     Patient saw Urology  They recommended a 1 year repeat blood work and then digital rectal exam          Hyperlipidemia     Cholesterol is clearly been elevated in the past, but given his problems with statins, I would not recommend starting them  Also, at his age, statins are relatively contraindicated  Hypertension     Blood pressure doing well today  No changes  Hypothyroidism     Noted at 1 point  No changes recommended  White matter disease     Stable at the moment  Follow-up in the future  Goal would be to try and decrease further events, but unfortunately statin therapy is off the table at this point  Continue on fish oils  COVID 19 Instructions    Rubén Hanson was advised to limit contact with others to essential tasks such as getting food, medications, and medical care  Proper handwashing reviewed, and Hand sanitzer when washing is not available  If the patient develops symptoms of COVID 19, the patient should call the office as soon as possible  For [id number] Flu season, it is strongly recommended that Flu Vaccinations be obtained  Please try to download Google Duo  Once you do download this on your phone, you will be prompted to add your phone number to the account  After that, he should receive a text from [employer], and use that code to verify your phone number    After that, you should be able to use Google Duo to receive and make video calls  Please download Microsoft Teams to your phone or computer  We will be transitioning to this platform for Video Visits  Instructions for downloading this are available from the office  We are committed to getting you vaccinated as soon as possible and will be closely following CDC and [facility]  guidelines as they are released and revised  Please refer to our COVID-19 vaccine webpage for the most up to date information on the vaccine and our distribution efforts      Smita palm

## 2021-01-21 ENCOUNTER — IMMUNIZATIONS (OUTPATIENT)
Dept: FAMILY MEDICINE CLINIC | Facility: HOSPITAL | Age: 81
End: 2021-01-21

## 2021-01-21 DIAGNOSIS — Z23 ENCOUNTER FOR IMMUNIZATION: Primary | ICD-10-CM

## 2021-01-21 PROCEDURE — 91301 SARS-COV-2 / COVID-19 MRNA VACCINE (MODERNA) 100 MCG: CPT

## 2021-01-21 PROCEDURE — 0011A SARS-COV-2 / COVID-19 MRNA VACCINE (MODERNA) 100 MCG: CPT

## 2021-02-19 ENCOUNTER — IMMUNIZATIONS (OUTPATIENT)
Dept: FAMILY MEDICINE CLINIC | Facility: HOSPITAL | Age: 81
End: 2021-02-19

## 2021-02-19 DIAGNOSIS — Z23 ENCOUNTER FOR IMMUNIZATION: Primary | ICD-10-CM

## 2021-02-19 PROCEDURE — 0012A SARS-COV-2 / COVID-19 MRNA VACCINE (MODERNA) 100 MCG: CPT

## 2021-02-19 PROCEDURE — 91301 SARS-COV-2 / COVID-19 MRNA VACCINE (MODERNA) 100 MCG: CPT

## 2021-02-25 ENCOUNTER — OFFICE VISIT (OUTPATIENT)
Dept: GERIATRICS | Age: 81
End: 2021-02-25
Payer: COMMERCIAL

## 2021-02-25 VITALS
OXYGEN SATURATION: 98 % | HEART RATE: 76 BPM | TEMPERATURE: 98.4 F | BODY MASS INDEX: 26.81 KG/M2 | SYSTOLIC BLOOD PRESSURE: 150 MMHG | WEIGHT: 170.8 LBS | HEIGHT: 67 IN | DIASTOLIC BLOOD PRESSURE: 86 MMHG

## 2021-02-25 DIAGNOSIS — E78.2 MIXED HYPERLIPIDEMIA: ICD-10-CM

## 2021-02-25 DIAGNOSIS — F03.90 DEMENTIA WITHOUT BEHAVIORAL DISTURBANCE, UNSPECIFIED DEMENTIA TYPE (HCC): Primary | ICD-10-CM

## 2021-02-25 DIAGNOSIS — I10 ESSENTIAL HYPERTENSION: ICD-10-CM

## 2021-02-25 PROCEDURE — 99215 OFFICE O/P EST HI 40 MIN: CPT | Performed by: STUDENT IN AN ORGANIZED HEALTH CARE EDUCATION/TRAINING PROGRAM

## 2021-02-25 PROCEDURE — 3077F SYST BP >= 140 MM HG: CPT | Performed by: STUDENT IN AN ORGANIZED HEALTH CARE EDUCATION/TRAINING PROGRAM

## 2021-02-25 PROCEDURE — 3079F DIAST BP 80-89 MM HG: CPT | Performed by: STUDENT IN AN ORGANIZED HEALTH CARE EDUCATION/TRAINING PROGRAM

## 2021-02-25 PROCEDURE — 1036F TOBACCO NON-USER: CPT | Performed by: STUDENT IN AN ORGANIZED HEALTH CARE EDUCATION/TRAINING PROGRAM

## 2021-02-25 PROCEDURE — 1160F RVW MEDS BY RX/DR IN RCRD: CPT | Performed by: STUDENT IN AN ORGANIZED HEALTH CARE EDUCATION/TRAINING PROGRAM

## 2021-02-25 RX ORDER — DIPHENOXYLATE HYDROCHLORIDE AND ATROPINE SULFATE 2.5; .025 MG/1; MG/1
1 TABLET ORAL DAILY
COMMUNITY

## 2021-02-25 NOTE — ASSESSMENT & PLAN NOTE
·  Secondary to CVA  ·  Based on today's assessment patient dementia is progressing  ·  repeat [place] exam, most recent  On 11/10/20--18/30  ·  information about different resources were provided to the patient's wife  ·  continue supportive management

## 2021-02-25 NOTE — PROGRESS NOTES
Assessment/Plan:    Dementia ([facility] [address] )  ·  Secondary to CVA  ·  Based on today's assessment patient dementia is progressing  ·  repeat [place] exam, most recent  On 11/10/20--18/30  ·  information about different resources were provided to the patient's wife  ·  continue supportive management    Hyperlipidemia  ·  Given the patient's significant  Intolerance to statin will continue to monitor off statins       There are no diagnoses linked to this encounter  Subjective:      Patient ID: Stephanie Jaramillo is a 79 y o  male  Patient has past medical history significant for dementia, hypertension, hypothyroidism and previous CVA  Patient is being evaluated today as part of his routine follow ups  Most of the information was obtained from the wife was present during today's visit  Per wife the patient is worse compared to prior visit  She noted that his memory is declining, he became more emotionally liable with outbursts of anger  She reports that he has good and bad days   She denied any recent falls or hospital stays   She denies any dizziness, ambulatory dysfunction, patient does not use any assistive devices, he has no issues with stairs  Wife also stated that patient has no urinary symptoms denies any incontinence, urgency foot reported frequency  Denies any constipation, diarrhea, or incontinence  Reports good sleep with patient taking naps during the day and go to bed around 9:00 p m  wakes up around 6:00 a m  no changes of his hearing or vision with no reported new symptoms  No reported weight loss, no difficulty swallowing or choking events suggestive for aspiration        The following portions of the patient's history were reviewed and updated as appropriate: allergies, current medications, past family history, past medical history, past social history, past surgical history and problem list     Review of Systems   Unable to perform ROS: Dementia         Objective:      /86   Pulse 76   Temp 98 4 °F (36 9 °C) (Temporal)   Ht 5' 7" (1 702 m)   Wt 77 5 kg (170 lb 12 8 oz)   SpO2 98%   BMI 26 75 kg/m²          Physical Exam  Please refer to attending attestation for physical exam findings

## 2021-02-25 NOTE — PROGRESS NOTES
[address]  [phone]  Social Work Follow-Up    LCSW met with Tamar Patrick for follow up visit today  Completed Saw Cognitive Assessment, score 14/30 (decreased from 18/30)  LCSW also met with Nanda to review additional resources such as in home care and adult day services  She will plan to reach out to determine if someone from their [employer] might be able to come by to provide brief respite for her (and socialization/cognitive stimulation for Tamar Patrick)  She has not yet spoken to their , but will plan to do so  She is looking forward to spending time golfing when it is warmer, and knows that Tamar Patrick would not be able to be left home alone  We discussed the difficulty of hearing that Jasbir's dementia has progressed from mild to moderate  LCSW reiterated availability for support and emphasized importance of continued supervision due to potential for safety issues  [employer] Version 8 3  Education: Bachelor's    Points Earned POSSIBLE Points   Visuospatial/Executive   Alternating Hoople Making 0 1   Visuoconstructional skills 1 1   Visuoconstructional skills (clock) 1 3   Naming   Naming Animals 3 3   Attention   Digit Span 2 2   Vigilance (letters) 1 1   Serial 7 subtraction 3 3   Language   Sentence Repetition 1 2   Verbal fluency 0 1   Abstraction   Abstraction (word pairings) 0 2   Delayed recall   Delayed recall 0 5   Memory index score: 0/15   Orientation   Orientation 2 6   TOTAL SCORE: 14/30  (Normal ?26/30)   Additional notes:        LCSW to remain available as needed

## 2021-03-02 ENCOUNTER — TELEPHONE (OUTPATIENT)
Dept: GERIATRICS | Age: 81
End: 2021-03-02

## 2021-03-02 NOTE — TELEPHONE ENCOUNTER
Nanda spoke with [employer], , reporting that she misplaced information previously provided about home care agencies in the [facility] area  She requests that  mail these resources if possible  LCSW also received voicemail from Larue  Nanda specified that no return call is necessary       Mailed today to address on file:  - [facility], highlighting home care resources (pgs 7-8) in our area (such as [facility], [facility], [facility])  - LCSW contact information for follow-up as needed

## 2021-03-08 NOTE — TELEPHONE ENCOUNTER
LCSW followed up with Nanda today by phone to discuss resources further  Nanda did receive information and reports she thought I may have mentioned an additional home care aide (outside of pamphlet provided)  Regardless, she does plan to meet with a RN this week who was recommended to her from a friend (who hired her previously for home care)  Nanda hopes to have a home care aide with some flexibility in terms of schedule who can assist Jalen Cedillo at home (while she is at golf, playing cards, volunteering, etc)  She did express a concern that he would likely not be receptive to this assistance; LCSW encouraged her to ask the RN during meeting how she may approach Jalen Cedillo is he were apprehensive  Commended Nanda for pursuing home care and 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

## 2021-03-09 ENCOUNTER — EVALUATION (OUTPATIENT)
Dept: SPEECH THERAPY | Facility: REHABILITATION | Age: 81
End: 2021-03-09
Payer: COMMERCIAL

## 2021-03-09 DIAGNOSIS — I63.313 CEREBROVASCULAR ACCIDENT (CVA) DUE TO BILATERAL THROMBOSIS OF MIDDLE CEREBRAL ARTERIES (HCC): ICD-10-CM

## 2021-03-09 DIAGNOSIS — F03.90 DEMENTIA WITHOUT BEHAVIORAL DISTURBANCE, UNSPECIFIED DEMENTIA TYPE (HCC): ICD-10-CM

## 2021-03-09 DIAGNOSIS — R90.82 WHITE MATTER DISEASE: ICD-10-CM

## 2021-03-09 DIAGNOSIS — R48.8 OTHER SYMBOLIC DYSFUNCTIONS: Primary | ICD-10-CM

## 2021-03-09 PROCEDURE — 92523 SPEECH SOUND LANG COMPREHEN: CPT

## 2021-03-09 NOTE — PROGRESS NOTES
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 )  I63 313    4  White matter disease  R90 82          Visit tracking:  -Referring provider: Epic  -Billing guidelines: AMA  -Visit #1  -Insurance: [address] (no Mabeline Barnacle, BOMN)  -RE due 2021    Subjective comments: Patient presents with wife this date    How did the patient hear about us? Physician    Patient's goal(s): improve memory, word finding    Reason for referral: Change in cognitive status  Prior functional status: Requires caregiver assistance for daily function  Clinically complex situations: Mental/behavioral diagnosis affecting rate of recovery    History: Patient is a 79 y o  male who was referred to outpatient skilled Speech Therapy services for a cognitive-linguistic evaluation  Patient presents with medical history of dementia, CVA, white matter disease resulting in other symbolic dysfunctions Patient was previously able to communicate within environment with wife support however presents with reduced recall, reduced communication accuracy impacting functionality  Patient requires skilled SLP interventions at this time in order to reduce risk of further decline in function, reduce risk of miscommunication of wants/needs, reduced participation in meaningful conversation and daily functional tasks       Hearing: WFL  Vision: WFL with glasses    Home environment/lifestyle: lives at home with wife  Highest level of education: DNT this date  Vocational status: , now retired    Mental status: Alert  Behavior status: Cooperative  Communication modalities: Verbal  Rehabilitation prognosis: Good rehab potential to reach the established goals    Assessments    The [address] (UMS) examination is a 30-point screening questionnaire that tests for orientation, memory, attention, and executive functions  It is used to detect dementia and mild neurocognitive disorder (MNCD)  During todays administration of the test, pt achieved a total score of 9/30 points, which is suggestive of "dementia"  Patient with score of 19/20 on sentence comprehension test   Patient noted to have frequent paraphasias, frequent significant word finding difficulties, tangential conversation impacting communication accuracy  Goals    Short-term goals:    1  Patient will participate in continued therapeutic trials in 90% of opportunities for continued assessment and recommendations for carryover and functional tasks at home, to be achieved in 4-6 weeks  2  Patient will improve long term memory retrieval and recall of information during reminiscing tasks with 80% accuracy, to be achieved in 4-6 weeks  3  Patient will complete concrete and abstract categorization tasks to 80% accuracy to facilitate improved generative naming skills and working memory allowing for improved communication within environment, to be achieved in 4-6 weeks  4  Patient/wife will be educated on the use of internal and external memory aids and compensatory strategies with 80% accuracy to facilitate increased recall of routine, personal information, and recent events, to be achieved in 4-6 weeks  5  Patient will demonstrate improved attention to task to 75% accuracy x5 mins provided cues as needed to redirect to task, to allow for improved task completion of 80% accuracy, to be achieved in 4-6 weeks  Long-term goals:  1   Patient will improve functional cognitive-linguistic skills with the implementation of cues and external aids, by discharge  2  Patient will demonstrate adequate memory/reasoning/judgment to perform desired activities in a supervised environment provided cues by caregivers by discharge  3  Patient will improve ability to facilitate communication to meet needs including use of compensatory strategies to promote meaningful interactions for improved quality of life and maximize level of independence  Impressions/Recommendations    Impressions:   - Patient presents with severe cognitive-linguistic difficulties, characterized by reduced recall abilities, reduced topic maintenance,  decreased orientation, and decreased word finding  Administration of the SLUMS was completed on this date to assess patient cognitive-linguistic status; patient received a score of 9/30, which is consistent with a dementia diagnosis  Additionally, informal assessment of automatic naming, confrontational naming, and yes/no reliability took place on this date  Patient with  100% accuracy during automatic naming tasks(months, days, counting)  Patient completed confrontational naming of real objects with 70% accuracy; therapist noted increased paraphasias during this task and substitutions of function for name of item  However, with mod verbal cues, patient able to name items accurately  Patient completed yes/no reliability task with 95% accuracy  Patient with 50% accuracy of biographical information asked this date  Discussed with wife potential development of memory book however states note interested at this time  Wife reports she manages higher level home management tasks  Patient would benefit from skilled SLP interventions to facilitate improved communication of wants/needs, improve attention to task, identify appropriate preferred tasks for improved participation in preferred tasks, improve quality of life, and increase safety outcomes within the home environment       Recommendations:  -Patient would benefit from outpatient skilled Speech Therapy services: Cognitive-Linguistic therapy    -Frequency: 1-2x weekly  -Duration: 4-6 weeks    -Intervention certification from: 1/1/6237  -Intervention certification to: 2/53/0823    Patient was evaluated with David Wilson, graduate SLP student, and was under the direct supervision of JOSE Hunt , [address] [facility]

## 2021-03-16 ENCOUNTER — OFFICE VISIT (OUTPATIENT)
Dept: SPEECH THERAPY | Facility: REHABILITATION | Age: 81
End: 2021-03-16
Payer: COMMERCIAL

## 2021-03-16 DIAGNOSIS — R48.8 OTHER SYMBOLIC DYSFUNCTIONS: Primary | ICD-10-CM

## 2021-03-16 DIAGNOSIS — F03.90 DEMENTIA WITHOUT BEHAVIORAL DISTURBANCE, UNSPECIFIED DEMENTIA TYPE (HCC): ICD-10-CM

## 2021-03-16 DIAGNOSIS — I63.313 CEREBROVASCULAR ACCIDENT (CVA) DUE TO BILATERAL THROMBOSIS OF MIDDLE CEREBRAL ARTERIES (HCC): ICD-10-CM

## 2021-03-16 DIAGNOSIS — R90.82 WHITE MATTER DISEASE: ICD-10-CM

## 2021-03-16 PROCEDURE — 92507 TX SP LANG VOICE COMM INDIV: CPT
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
## 2021-03-23 ENCOUNTER — APPOINTMENT (OUTPATIENT)
Dept: SPEECH THERAPY | Facility: REHABILITATION | Age: 81
End: 2021-03-23
Payer: COMMERCIAL
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
## 2021-03-29 NOTE — PROGRESS NOTES
Daily Speech Treatment Note    Today's date: 3/30/2021  Patients name: Stephanie Jaramillo  : 1940  MRN: [id number]  Safety measures: medication allergies, memory difficulties  Referring provider: Goyo Zhu MD    Encounter Diagnosis     ICD-10-CM    1  Other symbolic dysfunctions  C74 8    2  Dementia without behavioral disturbance, unspecified dementia type ([facility] [address] )  F03 90    3  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 ([facility] )  I63 313    4  White matter disease  R90 82      Visit Tracking:  -Referring provider: Epic  -Billing guidelines: AMA  -Visit #3  -Insurance: [address] (no auth, BOMN)  -RE due 2021    Subjective/Behavioral:  - Patient reports doing well  Objective/Assessment:  -Patient's family member/caregiver was present during today's session  Short-term goals:  1  Patient will participate in continued therapeutic trials in 90% of opportunities for continued assessment and recommendations for carryover and functional tasks at home, to be achieved in 4-6 weeks  2  Patient will improve long term memory retrieval and recall of information during reminiscing tasks with 80% accuracy, to be achieved in 4-6 weeks  Patient completed sequencing task (ie: photo stimulus, field of 4) with 60% accuracy, improving to 90% accuracy given max verbal/visual cues  Patient benefited from task segmentation, semantic cuing, repetition of directions, and visual cancellation  Noted increased attention to task/ detail on this date  3  Patient will complete concrete and abstract categorization tasks to 80% accuracy to facilitate improved generative naming skills and working memory allowing for improved communication within environment, to be achieved in 4-6 weeks  Patient completed word finding task (ie: word search/crossword) with 95% accuracy given min verbal cues    During [address] task, therapist provided additional word finding opportunities at the conversational level  Patient demonstrated 80% accuracy in word finding abilities at the conversational level, provided min-mod verbal cues        4  Patient/wife will be educated on the use of internal and external memory aids and compensatory strategies with 80% accuracy to facilitate increased recall of routine, personal information, and recent events, to be achieved in 4-6 weeks       5  Patient will demonstrate improved attention to task to 75% accuracy x5 mins provided cues as needed to redirect to task, to allow for improved task completion of 80% accuracy, to be achieved in 4-6 weeks  Plan:  -Patient was provided with home exercises/activities to target goals in plan of care at the end of today's session   -Discussed session and patient's progress with caregiver/family member after today's session   -Continue with current plan of care  Patient was treated by Deon Mejia, graduate SLP student, and was under the direct supervision of JOSE Bass , [address] [facility]

## 2021-03-30 ENCOUNTER — OFFICE VISIT (OUTPATIENT)
Dept: SPEECH THERAPY | Facility: REHABILITATION | Age: 81
End: 2021-03-30
Payer: COMMERCIAL

## 2021-03-30 DIAGNOSIS — R90.82 WHITE MATTER DISEASE: ICD-10-CM

## 2021-03-30 DIAGNOSIS — I63.313 CEREBROVASCULAR ACCIDENT (CVA) DUE TO BILATERAL THROMBOSIS OF MIDDLE CEREBRAL ARTERIES (HCC): ICD-10-CM

## 2021-03-30 DIAGNOSIS — F03.90 DEMENTIA WITHOUT BEHAVIORAL DISTURBANCE, UNSPECIFIED DEMENTIA TYPE (HCC): ICD-10-CM

## 2021-03-30 DIAGNOSIS — R48.8 OTHER SYMBOLIC DYSFUNCTIONS: Primary | ICD-10-CM

## 2021-03-30 PROCEDURE — 92507 TX SP LANG VOICE COMM INDIV: CPT

## 2021-04-06 NOTE — PROGRESS NOTES
Daily Speech Treatment Note    Today's date: 2021  Patients name: Nayeli Velasquez  : 1940  MRN: [id number]  Safety measures: medication allergies, memory difficulties  Referring provider: Tommy Collins MD    No diagnosis found  Visit Tracking:  -Referring provider: Epic  -Billing guidelines: AMA  -Visit #3 ***  -Insurance: [email or web address] ([place], [place])  -RE due 2021    Subjective/Behavioral:  - Patient reports doing well  Objective/Assessment:  -Patient's family member/caregiver was present during today's session  Short-term goals:  1  Patient will participate in continued therapeutic trials in 90% of opportunities for continued assessment and recommendations for carryover and functional tasks at home, to be achieved in 4-6 weeks  2  Patient will improve long term memory retrieval and recall of information during reminiscing tasks with 80% accuracy, to be achieved in 4-6 weeks  Patient completed sequencing task (ie: photo stimulus, field of 4) with 60% accuracy, improving to 90% accuracy given max verbal/visual cues  Patient benefited from task segmentation, semantic cuing, repetition of directions, and visual cancellation  Noted increased attention to task/ detail on this date  ***    3  Patient will complete concrete and abstract categorization tasks to 80% accuracy to facilitate improved generative naming skills and working memory allowing for improved communication within environment, to be achieved in 4-6 weeks  Patient completed word finding task (ie: word search/crossword) with 95% accuracy given min verbal cues  During [address] task, therapist provided additional word finding opportunities at the conversational level  Patient demonstrated 80% accuracy in word finding abilities at the conversational level, provided min-mod verbal cues   ***       4  Patient/wife will be educated on the use of internal and external memory aids and compensatory strategies with 80% accuracy to facilitate increased recall of routine, personal information, and recent events, to be achieved in 4-6 weeks       5  Patient will demonstrate improved attention to task to 75% accuracy x5 mins provided cues as needed to redirect to task, to allow for improved task completion of 80% accuracy, to be achieved in 4-6 weeks  Plan:  -Patient was provided with home exercises/activities to target goals in plan of care at the end of today's session   -Discussed session and patient's progress with caregiver/family member after today's session   -Continue with current plan of care  Patient was treated by Jacqueline Parks, graduate SLP student, and was under the direct supervision of JOSE Lawson , [address] [facility]

## 2021-04-07 ENCOUNTER — APPOINTMENT (OUTPATIENT)
Dept: SPEECH THERAPY | Facility: REHABILITATION | Age: 81
End: 2021-04-07
Payer: COMMERCIAL

## 2021-04-15 ENCOUNTER — OFFICE VISIT (OUTPATIENT)
Dept: SPEECH THERAPY | Facility: REHABILITATION | Age: 81
End: 2021-04-15
Payer: COMMERCIAL

## 2021-04-15 DIAGNOSIS — I63.313 CEREBROVASCULAR ACCIDENT (CVA) DUE TO BILATERAL THROMBOSIS OF MIDDLE CEREBRAL ARTERIES (HCC): ICD-10-CM

## 2021-04-15 DIAGNOSIS — R90.82 WHITE MATTER DISEASE: ICD-10-CM

## 2021-04-15 DIAGNOSIS — R48.8 OTHER SYMBOLIC DYSFUNCTIONS: Primary | ICD-10-CM

## 2021-04-15 DIAGNOSIS — F03.90 DEMENTIA WITHOUT BEHAVIORAL DISTURBANCE, UNSPECIFIED DEMENTIA TYPE (HCC): ICD-10-CM

## 2021-04-15 PROCEDURE — 92507 TX SP LANG VOICE COMM INDIV: CPT

## 2021-04-15 NOTE — PROGRESS NOTES
Daily Speech Treatment Note    Today's date: 4/15/2021  Patients name: Clarke Kaye  : 1940  MRN: [id number]  Safety measures: medication allergies, memory difficulties  Referring provider: Amanda Diaz MD    Encounter Diagnosis     ICD-10-CM    1  Other symbolic dysfunctions  I70 4    2  Dementia without behavioral disturbance, unspecified dementia type ([facility] [address] )  F03 90    3  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 ([facility] )  I63 313    4  White matter disease  R90 82         Visit Tracking:  -Referring provider: Epic  -Billing guidelines: AMA  -Visit #4  -Insurance: [address] ([place], [place])  -RE due 2021    Subjective/Behavioral:  - Patient reports doing "good"  - Wife reports patient doing well with previously provided HW    Objective/Assessment:  -Patient's family member/caregiver was present during today's session  Short-term goals:  1  Patient will participate in continued therapeutic trials in 90% of opportunities for continued assessment and recommendations for carryover and functional tasks at home, to be achieved in 4-6 weeks  2  Patient will improve long term memory retrieval and recall of information during reminiscing tasks with 80% accuracy, to be achieved in 4-6 weeks  Patient completed reminiscing task with 70% accuracy in memory retrieval; accuracy confirmed by caregiver present during session  Noted improvement in overall discourse on this date  3  Patient will complete concrete and abstract categorization tasks to 80% accuracy to facilitate improved generative naming skills and working memory allowing for improved communication within environment, to be achieved in 4-6 weeks  Patient completed word finding task of mod complexity (alphabetical/categorical restrictions) with 90% accuracy given min verbal cues   Patient benefited from semantic/phonemic cues for increased success       4  Patient/wife will be educated on the use of internal and external memory aids and compensatory strategies with 80% accuracy to facilitate increased recall of routine, personal information, and recent events, to be achieved in 4-6 weeks  Therapist discussed memory book to facilitate increased orientation, recall; caregiver verbalized understanding, however is not interested at this time       5  Patient will demonstrate improved attention to task to 75% accuracy x5 mins provided cues as needed to redirect to task, to allow for improved task completion of 80% accuracy, to be achieved in 4-6 weeks  Patient demonstrated appropriate attention to task in 80% of opportunities in therapeutic tasks throughout the session  Patient required less redirection to task in comparison to previous sessions  As task progressed, therapist complicated task by conversing with patient; patient able to demonstrate continued attention to task  Caregiver provided with education on communication/dementia and associated topics handout this date to be completed at later date, caregiver verbalized understanding  Plan:  -Patient was provided with home exercises/activities to target goals in plan of care at the end of today's session   -Discussed session and patient's progress with caregiver/family member after today's session   -Continue with current plan of care  Patient was treated by Erika Desir, graduate SLP student, and was under the direct supervision of JOSE Price , [address] [place] [address]

## 2021-04-20 ENCOUNTER — OFFICE VISIT (OUTPATIENT)
Dept: SPEECH THERAPY | Facility: REHABILITATION | Age: 81
End: 2021-04-20
Payer: COMMERCIAL

## 2021-04-20 DIAGNOSIS — I63.313 CEREBROVASCULAR ACCIDENT (CVA) DUE TO BILATERAL THROMBOSIS OF MIDDLE CEREBRAL ARTERIES (HCC): ICD-10-CM

## 2021-04-20 DIAGNOSIS — R48.8 OTHER SYMBOLIC DYSFUNCTIONS: Primary | ICD-10-CM

## 2021-04-20 DIAGNOSIS — R90.82 WHITE MATTER DISEASE: ICD-10-CM

## 2021-04-20 DIAGNOSIS — F03.90 DEMENTIA WITHOUT BEHAVIORAL DISTURBANCE, UNSPECIFIED DEMENTIA TYPE (HCC): ICD-10-CM

## 2021-04-20 PROCEDURE — 92507 TX SP LANG VOICE COMM INDIV: CPT

## 2021-04-20 NOTE — PROGRESS NOTES
Daily Speech Treatment Note    Today's date: 2021  Patients name: Maria Isabel Sauceda  : 1940  MRN: [id number]  Safety measures: medication allergies, memory difficulties  Referring provider: Toño Beasley MD    Encounter Diagnosis     ICD-10-CM    1  Other symbolic dysfunctions  O79 6    2  Dementia without behavioral disturbance, unspecified dementia type ([facility] [address] )  F03 90    3  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 ([email or web address] )  I63 313    4  White matter disease  R90 82         Visit Tracking:  -Referring provider: Epic  -Billing guidelines: AMA  -Visit #5  -Insurance: [address] (no auth, BOMN)  -RE due 2021    Subjective/Behavioral:  - Patient reports doing "good", enthusiastic about homework completion  Objective/Assessment:  -Patient's family member/caregiver was present during today's session  Short-term goals:  1  Patient will participate in continued therapeutic trials in 90% of opportunities for continued assessment and recommendations for carryover and functional tasks at home, to be achieved in 4-6 weeks  2  Patient will improve long term memory retrieval and recall of information during reminiscing tasks with 80% accuracy, to be achieved in 4-6 weeks  Patient completed reminiscing task with 80% accuracy in memory retrieval; improved recall of family information/ names on this date, as confirmed by caregiver present at session  3  Patient will complete concrete and abstract categorization tasks to 80% accuracy to facilitate improved generative naming skills and working memory allowing for improved communication within environment, to be achieved in 4-6 weeks  Patient completed word finding task of mod complexity (letter restrictions) presented visually with 80% accuracy given min-mod verbal cues  Patient benefited from semantic/phonemic cues, cloze technique for increased success  Patient reported he enjoyed task       4  Patient/wife will be educated on the use of internal and external memory aids and compensatory strategies with 80% accuracy to facilitate increased recall of routine, personal information, and recent events, to be achieved in 4-6 weeks       5  Patient will demonstrate improved attention to task to 75% accuracy x5 mins provided cues as needed to redirect to task, to allow for improved task completion of 80% accuracy, to be achieved in 4-6 weeks  Patient demonstrated appropriate attention to task in 70% opportunities throughout therapeutic tasks  Patient benefited from dual completion/modeling of task (ie: therapist simultaneously completing task with patient), verbal reminders for increased success  As task progressed, patient demonstrated increased attention to task and required less redirection to sorting stipulations  Plan:  -Patient was provided with home exercises/activities to target goals in plan of care at the end of today's session   -Discussed session and patient's progress with caregiver/family member after today's session   -Continue with current plan of care  Patient was treated by Maria Luz Reddy, graduate SLP student, and was under the direct supervision of JOSE Mitchell , [address] [facility]

## 2021-04-29 ENCOUNTER — EVALUATION (OUTPATIENT)
Dept: SPEECH THERAPY | Facility: REHABILITATION | Age: 81
End: 2021-04-29
Payer: COMMERCIAL

## 2021-04-29 DIAGNOSIS — I63.313 CEREBROVASCULAR ACCIDENT (CVA) DUE TO BILATERAL THROMBOSIS OF MIDDLE CEREBRAL ARTERIES (HCC): ICD-10-CM

## 2021-04-29 DIAGNOSIS — R90.82 WHITE MATTER DISEASE: ICD-10-CM

## 2021-04-29 DIAGNOSIS — R48.8 OTHER SYMBOLIC DYSFUNCTIONS: Primary | ICD-10-CM

## 2021-04-29 DIAGNOSIS — F03.90 DEMENTIA WITHOUT BEHAVIORAL DISTURBANCE, UNSPECIFIED DEMENTIA TYPE (HCC): ICD-10-CM

## 2021-04-29 PROCEDURE — 92507 TX SP LANG VOICE COMM INDIV: CPT

## 2021-04-29 NOTE — PROGRESS NOTES
Speech-Language Pathology Re-Evaluation    Today's date: 2021  Patients name: Rob Lainez  : 1940  MRN: [id number]  Safety measures: medication allergies, memory difficulties  Referring provider: Irasema Jimenes MD      Encounter Diagnosis     ICD-10-CM    1  Other symbolic dysfunctions  O88 0    2  Dementia without behavioral disturbance, unspecified dementia type ([facility] [address] )  F03 90    3  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 ([facility] [address] )  I63 313    4  White matter disease  R90 82          Visit Tracking:  -Referring provider: Epic  -Billing guidelines: AMA  -Visit #6  -Insurance: [address] (no [facility], [facility])  -RE due 2021    Subjective comments: Patient presents to session with wife, pleasant and cooperative this date    Patient's goal(s): to improve recall, communication    Assessments    The [employer] Mental Status (SLUMS) examination is a 30-point screening questionnaire that tests for orientation, memory, attention, and executive functions  It is used to detect dementia and mild neurocognitive disorder (MNCD)  During todays administration of the test, pt achieved a total score of 12/30 points, which is suggestive of "dementia" as reflected as patient current dx, however noted improvement since IE  (previously with score of 9/30 on IE)  Patient required max verbal cues for completion of task, mod cues to improve attention to task this date  Pt asked to name pictures of real objects presented, pt with 80% accuracy on initial try independently , able to improve to 90% provided moderate  descriptive/ phonemic level cues cues  Goals      Short-term goals:    1  Patient will participate in continued therapeutic trials in 90% of opportunities for continued assessment and recommendations for carryover and functional tasks at home, to be achieved in 4-6 weeks  -- CONTINUE    2   Patient will improve long term memory retrieval and recall of information during reminiscing tasks with 80% accuracy, to be achieved in 4-6 weeks  --CONTINUE    3  Patient will complete concrete and abstract categorization tasks to 80% accuracy to facilitate improved generative naming skills and working memory allowing for improved communication within environment, to be achieved in 4-6 weeks --CONTINUE    4  Patient/wife will be educated on the use of internal and external memory aids and compensatory strategies with 80% accuracy to facilitate increased recall of routine, personal information, and recent events, to be achieved in 4-6 weeks  --CONTINUE    5  Patient will demonstrate improved attention to task to 75% accuracy x5 mins provided cues as needed to redirect to task, to allow for improved task completion of 80% accuracy, to be achieved in 4-6 weeks  --CONTINUE    6  Patient will demonstrate improved ability to follow 2 step directions within environment with 80% accuracy provided cues as needed-- NEW GOAL      Long-term goals:  1  Patient will improve functional cognitive-linguistic skills with the implementation of cues and external aids, by discharge  --CONTINUE     2  Patient will demonstrate adequate memory/reasoning/judgment to perform desired activities in a supervised environment provided cues by caregivers by discharge  --CONTINUE    3  Patient will improve ability to facilitate communication to meet needs including use of compensatory strategies to promote meaningful interactions for improved quality of life and maximize level of independence  --CONTINUE  Impressions/Recommendations    Impressions:     - Patient presents with severe cognitive-linguistic difficulties, characterized by reduced recall abilities, reduced topic maintenance,  decreased orientation, and decreased word finding   Administration of the SLUMS was completed on this date to assess patient cognitive-linguistic status; patient received a score of 12/30, which is consistent with a dementia diagnosis, noted improvement from score of 9/30 on initial evaluation  Patient completed confrontational naming of real objects with 80% accuracy; improved from rating of 70% accuracy on IE  Patient has demonstrated improvement in word finding, improved ability to particpate in activities of preference and would continue to benefit from skilled SLP services  Patient would benefit from continued  skilled SLP interventions to facilitate improved communication of wants/needs, improve attention to task allowing for improved task completion, identify appropriate preferred tasks for improved participation in preferred tasks and development of home program for carryover outside of skilled SLP interventions, improve quality of life, and increase safety outcomes within the home environment  Recommendations:  -Patient would benefit from outpatient skilled Speech Therapy services : Speech/ language therapy and Cognitive-Linguistic therapy    -Frequency: 1x weekly  -Duration: 6-8 weeks    -Intervention certification from: 8/22/7313  -Intervention certification to: 1/28/9127    Patient was assessed by Teo Downey, graduate SLP student, and was under the direct supervision of JOSE Frances , [id number] [place] [address]

## 2021-05-04 ENCOUNTER — OFFICE VISIT (OUTPATIENT)
Dept: SPEECH THERAPY | Facility: REHABILITATION | Age: 81
End: 2021-05-04
Payer: COMMERCIAL

## 2021-05-04 DIAGNOSIS — F03.90 DEMENTIA WITHOUT BEHAVIORAL DISTURBANCE, UNSPECIFIED DEMENTIA TYPE (HCC): ICD-10-CM

## 2021-05-04 DIAGNOSIS — R48.8 OTHER SYMBOLIC DYSFUNCTIONS: Primary | ICD-10-CM

## 2021-05-04 DIAGNOSIS — R90.82 WHITE MATTER DISEASE: ICD-10-CM

## 2021-05-04 DIAGNOSIS — I63.313 CEREBROVASCULAR ACCIDENT (CVA) DUE TO BILATERAL THROMBOSIS OF MIDDLE CEREBRAL ARTERIES (HCC): ICD-10-CM

## 2021-05-04 PROCEDURE — 92507 TX SP LANG VOICE COMM INDIV: CPT

## 2021-05-04 NOTE — PROGRESS NOTES
Daily Speech Treatment Note    Today's date: 2021  Patients name: Lori Vaca  : 1940  MRN: [id number]  Safety measures: medication allergies, reduced recall  Referring provider: Beryle Lands, MD    Encounter Diagnosis     ICD-10-CM    1  Other symbolic dysfunctions  F57 6    2  Dementia without behavioral disturbance, unspecified dementia type ([facility] [address] )  F03 90    3  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  I63 313    4  White matter disease  R90 82          Visit Tracking:  -Referring provider: Epic  -Billing guidelines: AMA  -Visit #7  -Insurance: [address] (no auth, BOMN)  -RE due 2021    Subjective/Behavioral:  -Patient reports he had a good weekend, did "activities"    Objective/Assessment:  -Patient's family member/caregiver was present during today's session  Short-term goals:    1  Patient will participate in continued therapeutic trials in 90% of opportunities for continued assessment and recommendations for carryover and functional tasks at home, to be achieved in 4-6 weeks  -- CONTINUE    2  Patient will improve long term memory retrieval and recall of information during reminiscing tasks with 80% accuracy, to be achieved in 4-6 weeks  --CONTINUE    3  Patient will complete concrete and abstract categorization tasks to 80% accuracy to facilitate improved generative naming skills and working memory allowing for improved communication within environment, to be achieved in 4-6 weeks --CONTINUE  Pt completed abstract level generative naming tasks of moderate level complexity (provided letter and category) with 80% accuracy independently, able to improve to 90% accuracy provided mild verbal descriptive and phonemic cues in order to facilitate improved word finding during functional conversational tasks      4  Patient/wife will be educated on the use of internal and external memory aids and compensatory strategies with 80% accuracy to facilitate increased recall of routine, personal information, and recent events, to be achieved in 4-6 weeks  --CONTINUE  Patient trained in mental rehearsal and association techniques in order to facilitate improved recall of information/instructions/events within environment  Patient required moderate to max level cues for improved accuracy during therapeutic tasks, with use of strategies patient able to improve IM recall to 90% accuracy, recall at 1 min intervals able to recall 1/4 items provided mild descriptive cues  5  Patient will demonstrate improved attention to task to 75% accuracy x5 mins provided cues as needed to redirect to task, to allow for improved task completion of 80% accuracy, to be achieved in 4-6 weeks  --CONTINUE    6  Patient will demonstrate improved ability to follow 2 step directions within environment with 80% accuracy provided cues as needed-- NEW GOAL  Patient completed 4-5 step sequencing tasks to allow for improved ability to follow directions within environment, patient required mild cues for 4 step direction accuracy, moderate to max a times for 5 step, able to reduce to mild as session progressed  Patient followed 1 step directions within environment with 75% accuracy this date, requiring mild level cues for improved task completion  Plan:  -Patient was provided with home exercises/activities to target goals in plan of care at the end of today's session   -Continue with current plan of care

## 2021-05-06 ENCOUNTER — APPOINTMENT (OUTPATIENT)
Dept: LAB | Facility: CLINIC | Age: 81
End: 2021-05-06
Payer: COMMERCIAL

## 2021-05-06 ENCOUNTER — TRANSCRIBE ORDERS (OUTPATIENT)
Dept: LAB | Facility: CLINIC | Age: 81
End: 2021-05-06

## 2021-05-06 ENCOUNTER — OFFICE VISIT (OUTPATIENT)
Dept: GERIATRICS | Age: 81
End: 2021-05-06
Payer: COMMERCIAL

## 2021-05-06 VITALS
TEMPERATURE: 98.1 F | HEIGHT: 68 IN | WEIGHT: 176.6 LBS | BODY MASS INDEX: 26.76 KG/M2 | OXYGEN SATURATION: 95 % | SYSTOLIC BLOOD PRESSURE: 142 MMHG | DIASTOLIC BLOOD PRESSURE: 86 MMHG | HEART RATE: 88 BPM | RESPIRATION RATE: 14 BRPM

## 2021-05-06 DIAGNOSIS — Z11.1 SCREENING EXAMINATION FOR PULMONARY TUBERCULOSIS: Primary | ICD-10-CM

## 2021-05-06 DIAGNOSIS — Z11.1 SCREENING FOR TUBERCULOSIS: ICD-10-CM

## 2021-05-06 DIAGNOSIS — I63.313 CEREBROVASCULAR ACCIDENT (CVA) DUE TO BILATERAL THROMBOSIS OF MIDDLE CEREBRAL ARTERIES (HCC): ICD-10-CM

## 2021-05-06 DIAGNOSIS — E78.2 MIXED HYPERLIPIDEMIA: ICD-10-CM

## 2021-05-06 DIAGNOSIS — I10 ESSENTIAL HYPERTENSION: ICD-10-CM

## 2021-05-06 DIAGNOSIS — F03.90 DEMENTIA WITHOUT BEHAVIORAL DISTURBANCE, UNSPECIFIED DEMENTIA TYPE (HCC): Primary | ICD-10-CM

## 2021-05-06 PROCEDURE — 99215 OFFICE O/P EST HI 40 MIN: CPT | Performed by: STUDENT IN AN ORGANIZED HEALTH CARE EDUCATION/TRAINING PROGRAM

## 2021-05-06 PROCEDURE — 86480 TB TEST CELL IMMUN MEASURE: CPT

## 2021-05-06 PROCEDURE — 3077F SYST BP >= 140 MM HG: CPT | Performed by: STUDENT IN AN ORGANIZED HEALTH CARE EDUCATION/TRAINING PROGRAM

## 2021-05-06 PROCEDURE — 3079F DIAST BP 80-89 MM HG: CPT | Performed by: STUDENT IN AN ORGANIZED HEALTH CARE EDUCATION/TRAINING PROGRAM

## 2021-05-06 PROCEDURE — 1036F TOBACCO NON-USER: CPT | Performed by: STUDENT IN AN ORGANIZED HEALTH CARE EDUCATION/TRAINING PROGRAM

## 2021-05-06 PROCEDURE — 36415 COLL VENOUS BLD VENIPUNCTURE: CPT

## 2021-05-06 PROCEDURE — 1160F RVW MEDS BY RX/DR IN RCRD: CPT | Performed by: STUDENT IN AN ORGANIZED HEALTH CARE EDUCATION/TRAINING PROGRAM

## 2021-05-06 RX ORDER — MEMANTINE HYDROCHLORIDE 5 MG-10 MG
KIT ORAL SEE ADMIN INSTRUCTIONS
Qty: 1 PACKAGE | Refills: 0 | Status: SHIPPED | OUTPATIENT
Start: 2021-05-06 | End: 2021-06-10 | Stop reason: SDUPTHER

## 2021-05-06 NOTE — PROGRESS NOTES
Sylvieta Later [facility]  [address], [address] [facility][1], [address] [place] [address]    Neurocognitive assessment follow-up      NAME: Megan Martinez  AGE: 79 y o  SEX: male    DATE OF ENCOUNTER: 5/6/2021    Assessment and Plan     Problem List Items Addressed This Visit        Cardiovascular and Mediastinum    Hypertension      /86   Given prior history of stroke and  Moderate to severe dementia, would recommend goal blood pressure of 759 as systolic   Recommend adherence to a heart healthy diet  Follow-up with PCP for continued monitoring            Nervous and Auditory    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 (HCC)     No new focal neurological deficits  Manage risk factors for CVA   Recommend the Mediterranean diet which has shown to decrease the risk of memory loss and CVA            Dementia ([facility] [address] ) - Primary     Patient continues to have a progressive decline in overall cognition   Currently assessed as moderate to severe dementia with likely mixed etiology of Alzheimer's ( given low hippocampal score on MRI) versus vascular ( given prior CVA)  Intermittently confused at baseline during his office visit today   Will trial Namenda titration pack which has been indicated in mixed dementia per evidence based studies  Wife educated on side effects of Namenda   Will consider vitamin E supplementation at next visit in an effort to avoid starting to medications simultaneously  Discussed yet again that currently the patient meets criteria for placement into a higher level of care  Wife did discuss options with MSW  Wife notably overwhelmed and in agreement finally to start senior  at [facility]    In doing so the patient will have the opportunity to remain active mentally, physically and socially   Continue speech therapy for cognitive rehabilitation   Recommend the Mediterranean diet which has shown to decrease the risk of memory loss and CVA Reorientation and redirection as needed  Manage chronic conditions  Maintain [place] precautions  Encourage patient to remain active mentally, physically and socially   Patient should participate in cognitively challenging exercises as able         Relevant Medications    memantine (Namenda Joanne Soto)       Other    Hyperlipidemia       Stable   Recommend adherence to a heart healthy diet         Screening for tuberculosis    Relevant Orders    Quantiferon TB Gold Plus          Orders Placed This Encounter   Procedures    Quantiferon TB Gold Plus       - Counseling Documentation: patient was counseled regarding: diagnostic results, instructions for management, risk factor reductions, prognosis, patient and family education, impressions, risks and benefits of treatment options and importance of compliance with treatment    Chief Complaint     Patient presents for routine neurocognitive assessment follow-up    History of Present Illness     HPI     This is an 41-year-old male with prior CVA, HLD, HTN and moderate dementia who presents for routine neurocognitive assessment follow-up  He is accompanied by his wife during the entire visit  Given severity of patient's dementia, review of systems are unreliable  Per wife, the patient currently attends speech therapy  He did refuse going on 1 occasion and on another occasion refused to get out of the car upon arriving to [address] [place] [address] despite encouragement from his wife and the speech therapist     He recently has become more compliant with speech therapy and enjoys during his homework assignments  Wife does admit that overall the patient's neurocognitive symptoms continue to decline  She gives examples of the patient coming out of the shower soaked in his underwear and attempting to put wet clothing into his dresser  He continues to wear winter clothing despite the warm weather    Juan Jose Check has become more difficult as the patient appears to have more difficulty with word finding and repitition which was evident during today's visit  Over the past few months, the patient believed that there are people in the house who have been moving things around although it's only him and his wife   He often believes that he is at his parent's home at the [facility] instead of realizing he is at his own house  The patient often becomes upset if he is unable to have things done the way he would like them  Wife explains that he previously did enjoy building puzzles however  had not been doing so for the past few months  Recently though, the patient did assist his wife with completing a puzzle she had done on her own  The patient continues to nap during the daytime and sleeps well at nighttime  Wife gives examples of the patient's speaking about her in the 3rd person despite her being in the room with him  He often is forgetful of his children's and pets names while he is at speech therapy  The patient continues to tolerate oral intake, has not had any significant concerns about hygiene and has had no recent hospitalizations  Recently had multiple episodes of bedwetting which has since improved  He is able to perform most ADLs independently but remains fully dependent in IADLs  I did have a detailed discussion with the patient's wife who is notably very overwhelmed about the patient requiring a higher level of care as his disease progresses  Goal per wife is to keep the patient at home as long as possible  Wife finally was in agreement to enroll the patient into a senior  program where he would have the ability to remain active mentally, physically and socially    QuantiFERON gold was ordered as a criteria for admission to [facility]  program      The following portions of the patient's history were reviewed and updated as appropriate: allergies, current medications, past family history, past medical history, past social history, past surgical history and problem list     Review of Systems     Review of Systems   Unable to perform ROS: Dementia       Active Problem List     Patient Active Problem List   Diagnosis    Allergic rhinitis    Arthritis    Actinic keratosis    Elevated prostate specific antigen (PSA)    Erectile dysfunction of non-organic origin    External hemorrhoids    Gout    Hyperlipidemia    Hypothyroidism    Trigger finger    B12 deficiency    Spasticity    White matter disease    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 ([address] )    RBBB (right bundle branch block)    Hypertension    Dementia (HCC)    Leukopenia    Gait instability    Sleeping excessive    Screening for tuberculosis       Objective     /86 (BP Location: Left arm, Patient Position: Sitting, Cuff Size: Adult)   Pulse 88   Temp 98 1 °F (36 7 °C) (Temporal)   Resp 14   Ht 5' 7 72" (1 72 m)   Wt 80 1 kg (176 lb 9 6 oz)   SpO2 95%   BMI 27 08 kg/m²     Physical Exam  Vitals signs reviewed  Constitutional:       General: He is not in acute distress  Appearance: Normal appearance  He is well-developed  He is not ill-appearing or diaphoretic  Comments: Elderly male sitting comfortably in chair in no obvious cardiorespiratory or painful distress   HENT:      Head: Normocephalic and atraumatic  Right Ear: Tympanic membrane, ear canal and external ear normal       Left Ear: Tympanic membrane, ear canal and external ear normal       Nose: Nose normal       Mouth/Throat:      Mouth: Mucous membranes are moist       Pharynx: Oropharynx is clear  No oropharyngeal exudate  Eyes:      General: No scleral icterus  Right eye: No discharge  Left eye: No discharge  Conjunctiva/sclera: Conjunctivae normal    Neck:      Musculoskeletal: Normal range of motion and neck supple  Vascular: No JVD  Cardiovascular:      Rate and Rhythm: Normal rate and regular rhythm        Heart sounds: Normal heart sounds  No murmur  No friction rub  No gallop  Pulmonary:      Effort: Pulmonary effort is normal  No respiratory distress  Breath sounds: Normal breath sounds  No wheezing or rales  Abdominal:      General: Bowel sounds are normal  There is no distension  Palpations: Abdomen is soft  Tenderness: There is no abdominal tenderness  There is no guarding  Musculoskeletal: Normal range of motion  General: No tenderness or deformity  Skin:     General: Skin is warm  Coloration: Skin is not pale  Findings: No erythema or rash  Neurological:      General: No focal deficit present  Mental Status: He is alert  Mental status is at baseline  He is disoriented  Cranial Nerves: No cranial nerve deficit  Motor: No weakness  Gait: Gait normal       Deep Tendon Reflexes: Reflexes are normal and symmetric  Psychiatric:      Comments: Pleasantly confused at baseline         Pertinent Laboratory/Diagnostic Studies:  Reviewed prior blood work   No new lab orders placed today    Current Medications     Current Outpatient Medications:     multivitamin (THERAGRAN) TABS, Take 1 tablet by mouth daily, Disp: , Rfl:     Omega-3 Fatty Acids ([employer] FISH OIL) 1000 MG CAPS, Take by mouth, Disp: , Rfl:     memantine (Namenda Titration Charles), Take by mouth see administration instructions Follow package directions  , Disp: 1 Package, Rfl: 0    Health Maintenance     Health Maintenance   Topic Date Due    Medicare Annual Wellness Visit (AWV)  01/11/2020    SLP PLAN OF CARE  04/08/2021    DTaP,Tdap,and Td Vaccines (1 - Tdap) 09/01/2021 (Originally 1/15/1961)    Depression Screening PHQ  01/13/2022    Fall Risk  03/09/2022    BMI: Adult  05/06/2022    Pneumococcal Vaccine: 65+ Years  Completed    Influenza Vaccine  Completed    COVID-19 Vaccine  Completed    HIB Vaccine  Aged Out    Hepatitis B Vaccine  Aged Out    IPV Vaccine  Aged Out    Hepatitis A Vaccine  Aged Out    Meningococcal ACWY Vaccine  Aged Out    HPV Vaccine  Aged Out     Immunization History   Administered Date(s) Administered    H1N1, All Formulations 01/08/2010    INFLUENZA 10/20/2016    Influenza Recombinant Preservative Free Im 10/09/2018    Influenza Split High Dose Preservative Free IM 12/10/2012, 09/25/2014, 10/01/2019    Influenza, high dose seasonal 0 7 mL 11/09/2020    Pneumococcal Polysaccharide PPV23 02/16/2005, 10/20/2016    SARS-CoV-2 / COVID-19 mRNA IM (Gracielamijuan manuel Alvarez) 01/21/2021, 02/19/2021       Sheron Millard MD  Geriatrics   5/7/2021 5:30 AM

## 2021-05-07 NOTE — ASSESSMENT & PLAN NOTE
Patient continues to have a progressive decline in overall cognition   Currently assessed as moderate to severe dementia with likely mixed etiology of Alzheimer's ( given low hippocampal score on MRI) versus vascular ( given prior CVA)  Intermittently confused at baseline during his office visit today   Will trial Namenda titration pack which has been indicated in mixed dementia per evidence based studies  Wife educated on side effects of Namenda   Will consider vitamin E supplementation at next visit in an effort to avoid starting to medications simultaneously  Discussed yet again that currently the patient meets criteria for placement into a higher level of care  Wife did discuss options with MSW  Wife notably overwhelmed and in agreement finally to start senior  at [facility]    In doing so the patient will have the opportunity to remain active mentally, physically and socially   Continue speech therapy for cognitive rehabilitation   Recommend the Mediterranean diet which has shown to decrease the risk of memory loss and CVA   Reorientation and redirection as needed  Manage chronic conditions  Maintain [place] precautions  Encourage patient to remain active mentally, physically and socially   Patient should participate in cognitively challenging exercises as able

## 2021-05-07 NOTE — ASSESSMENT & PLAN NOTE
/86   Given prior history of stroke and  Moderate to severe dementia, would recommend goal blood pressure of 584 as systolic   Recommend adherence to a heart healthy diet  Follow-up with PCP for continued monitoring

## 2021-05-07 NOTE — ASSESSMENT & PLAN NOTE
No new focal neurological deficits  Manage risk factors for CVA   Recommend the Mediterranean diet which has shown to decrease the risk of memory loss and CVA

## 2021-05-10 LAB
GAMMA INTERFERON BACKGROUND BLD IA-ACNC: 0.04 IU/ML
M TB IFN-G BLD-IMP: NEGATIVE
M TB IFN-G CD4+ BCKGRND COR BLD-ACNC: 0 IU/ML
M TB IFN-G CD4+ BCKGRND COR BLD-ACNC: 0 IU/ML
MITOGEN IGNF BCKGRD COR BLD-ACNC: >10 IU/ML

## 2021-05-11 ENCOUNTER — TELEPHONE (OUTPATIENT)
Dept: GERIATRICS | Age: 81
End: 2021-05-11

## 2021-05-11 NOTE — TELEPHONE ENCOUNTER
Nanda called back  Relayed fee for completing paperwork for [facility] [place]  Caller relayed she thought this fee was part of the last office visit and requested bill be sent to her for the completion of the paperwork  Will route to Practice Administrator for bill to be created and sent to patient's spouse  Spouse is aware that paperwork for [facility] will not be faxed until payment is received  Caller understood

## 2021-05-11 NOTE — TELEPHONE ENCOUNTER
Left voicemail for patient's spouse, Roger Cartagena, to contact office regarding payment for completion of paperwork for [facility]   When receive payment, can fax paperwork to [address] is located in "to be faxed" bin in front office

## 2021-05-11 NOTE — PROGRESS NOTES
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 ([facility] )  I63 313    4  White matter disease  R90 82          Visit Tracking:  -Referring provider: Epic  -Billing guidelines: AMA  -Visit #8  -Insurance: [employer] (no [place], BOMN)  -RE due 2021    Subjective/Behavioral:  -Patient reports he is "okay"    Objective/Assessment:  -Patient's family member/caregiver was present during today's session  Short-term goals:    1  Patient will participate in continued therapeutic trials in 90% of opportunities for continued assessment and recommendations for carryover and functional tasks at home, to be achieved in 4-6 weeks  -- CONTINUE  Therapeutic trials of simple level card games to facilitate improved attention to task, organization of information in order to identify preferred tasks within functional living environment  Patient completed with 80% accuracy provided moderate cues, able to reduce to mild as session progressed  2  Patient will improve long term memory retrieval and recall of information during reminiscing tasks with 80% accuracy, to be achieved in 4-6 weeks  --CONTINUE    3   Patient will complete concrete and abstract categorization tasks to 80% accuracy to facilitate improved generative naming skills and working memory allowing for improved communication within environment, to be achieved in 4-6 weeks --CONTINUE    4  Patient/wife will be educated on the use of internal and external memory aids and compensatory strategies with 80% accuracy to facilitate increased recall of routine, personal information, and recent events, to be achieved in 4-6 weeks  --CONTINUE    5  Patient will demonstrate improved attention to task to 75% accuracy x5 mins provided cues as needed to redirect to task, to allow for improved task completion of 80% accuracy, to be achieved in 4-6 weeks  --CONTINUE  Patient completed divided attention/sequencing tasks with 75% accuracy provided mild to moderate cues to redirect to task for improved task completion  6  Patient will demonstrate improved ability to follow 2 step directions within environment with 80% accuracy provided cues as needed-- NEW GOAL    Patient followed moderately complex 1 step directions within environment with 70% accuracy this date, requiring mild to moderate level cues for improved task completion  Plan:  -Patient was provided with home exercises/activities to target goals in plan of care at the end of today's session   -Continue with current plan of care

## 2021-05-11 NOTE — TELEPHONE ENCOUNTER
Will US Mail the charge slip form for $15 00 to the patient with a self-addressed envelope enclosed  Will go out in the mail 5/12/21  Upon receipt of payment, will fax the completed forms and TB results to [facility]

## 2021-05-12 ENCOUNTER — OFFICE VISIT (OUTPATIENT)
Dept: SPEECH THERAPY | Facility: REHABILITATION | Age: 81
End: 2021-05-12
Payer: COMMERCIAL

## 2021-05-12 DIAGNOSIS — I63.313 CEREBROVASCULAR ACCIDENT (CVA) DUE TO BILATERAL THROMBOSIS OF MIDDLE CEREBRAL ARTERIES (HCC): ICD-10-CM

## 2021-05-12 DIAGNOSIS — R48.8 OTHER SYMBOLIC DYSFUNCTIONS: Primary | ICD-10-CM

## 2021-05-12 DIAGNOSIS — F03.90 DEMENTIA WITHOUT BEHAVIORAL DISTURBANCE, UNSPECIFIED DEMENTIA TYPE (HCC): ICD-10-CM

## 2021-05-12 DIAGNOSIS — R90.82 WHITE MATTER DISEASE: ICD-10-CM

## 2021-05-12 PROCEDURE — 92507 TX SP LANG VOICE COMM INDIV: CPT

## 2021-05-18 ENCOUNTER — EVALUATION (OUTPATIENT)
Dept: SPEECH THERAPY | Facility: REHABILITATION | Age: 81
End: 2021-05-18
Payer: COMMERCIAL

## 2021-05-18 DIAGNOSIS — R48.8 OTHER SYMBOLIC DYSFUNCTIONS: Primary | ICD-10-CM

## 2021-05-18 DIAGNOSIS — F03.90 DEMENTIA WITHOUT BEHAVIORAL DISTURBANCE, UNSPECIFIED DEMENTIA TYPE (HCC): ICD-10-CM

## 2021-05-18 DIAGNOSIS — I63.313 CEREBROVASCULAR ACCIDENT (CVA) DUE TO BILATERAL THROMBOSIS OF MIDDLE CEREBRAL ARTERIES (HCC): ICD-10-CM

## 2021-05-18 DIAGNOSIS — R90.82 WHITE MATTER DISEASE: ICD-10-CM

## 2021-05-18 PROCEDURE — 92507 TX SP LANG VOICE COMM INDIV: CPT

## 2021-05-25 ENCOUNTER — APPOINTMENT (OUTPATIENT)
Dept: SPEECH THERAPY | Facility: REHABILITATION | Age: 81
End: 2021-05-25
Payer: COMMERCIAL

## 2021-06-04 NOTE — TELEPHONE ENCOUNTER
Outgoing fax sent with completed health assessment form, TB result, and medication list  Nathaly Palomares by phone to confirm that this was sent  Received email from [employer] requesting that page 3 of the health assessment be updated with two additional questions  New form sent via email  Page 3 printed, placed in Dr Shaina Petersen for review

## 2021-06-04 NOTE — TELEPHONE ENCOUNTER
Received voicemail from Faby Mauricio at [facility] regarding [address]  which was reportedly completed last October  Upon chart review, more recent form has been completed and a charge slip was mailed 5/11; no documentation of payment received

## 2021-06-10 ENCOUNTER — TELEMEDICINE (OUTPATIENT)
Dept: GERIATRICS | Age: 81
End: 2021-06-10
Payer: COMMERCIAL

## 2021-06-10 DIAGNOSIS — F03.90 DEMENTIA WITHOUT BEHAVIORAL DISTURBANCE, UNSPECIFIED DEMENTIA TYPE (HCC): Primary | ICD-10-CM

## 2021-06-10 PROCEDURE — 99442 PR PHYS/QHP TELEPHONE EVALUATION 11-20 MIN: CPT | Performed by: STUDENT IN AN ORGANIZED HEALTH CARE EDUCATION/TRAINING PROGRAM

## 2021-06-10 RX ORDER — MEMANTINE HYDROCHLORIDE 5 MG-10 MG
KIT ORAL SEE ADMIN INSTRUCTIONS
Qty: 30 TABLET | Refills: 0 | Status: SHIPPED | OUTPATIENT
Start: 2021-06-10

## 2021-06-10 NOTE — PROGRESS NOTES
Virtual Brief Visit    Assessment/Plan:    Problem List Items Addressed This Visit        Nervous and Auditory    Dementia ([address] ) - Primary     Symptoms remain unchanged   Wife stating she did not receive Namenda titration pack which was sent in to [facility] as wife currently uses [facility] pharmacy now   [facility]   Patient will be starting senior  at [address] as needed  Manage chronic conditions  Maintain [place] precautions  Encourage patient remain active mentally, physically and socially  Patient to participate in cognitively challenging exercises as able         Relevant Medications    memantine (Namenda Titration [place])                Reason for visit is   Chief Complaint   Patient presents with    Follow-up     1 month     Virtual Brief Visit        Encounter provider Sheron Millard MD    Provider located at [address] [place] [address] Road  [address]  [facility] [address] [facility] 108  [phone]    Recent Visits  Date Type Provider Dept   06/10/21 Miguel Angel Lawrence MD [address] recent visits within past 7 days and meeting all other requirements     Future Appointments  No visits were found meeting these conditions  Showing future appointments within next 150 days and meeting all other requirements        After connecting through telephone, the patient was identified by name and date of birth  Sandra Cristina was informed that this is a telemedicine visit and that the visit is being conducted through telephone  My office door was closed  No one else was in the room  He acknowledged consent and understanding of privacy and security of the platform  The patient has agreed to participate and understands he can discontinue the visit at any time  Patient is aware this is a billable service       Subjective    Sandra Cristina is a 79 y o  male  who presents for medication check in after starting Namenda  HPI     Patient presents via telephone for follow-up after prescribed Namenda titration pack 1 month ago  Given patient's severity of dementia, questions were answered by patient's wife who is his primary caretaker  Wife explains that she did not receive the patient's Namenda packed which was sent to [facility]  Wife explains that she typically uses [facility] pharmacy, however she did not call our office to state she had not received the medications  Prescription sent in to [facility] and wife advised to start Namenda titration pack today  Symptoms currently remain unchanged with the patient however he has been noted to be sleeping more so during the daytime  Wife has enrolled the patient into [facility] senior  program   He will start his 1st day tomorrow  No overt changes or acute concerns since his prior visit a few weeks ago  Will plan to follow-up in 1 month for Namenda initiation medication  follow-up      Past Medical History:   Diagnosis Date    Basal cell adenocarcinoma     right thigh       Past Surgical History:   Procedure Laterality Date    VASECTOMY         Current Outpatient Medications   Medication Sig Dispense Refill    memantine (Namenda Titration [place]) Take by mouth see administration instructions Follow package directions  30 tablet 0    multivitamin (THERAGRAN) TABS Take 1 tablet by mouth daily      Omega-3 Fatty Acids ([facility] FISH OIL) 1000 MG CAPS Take by mouth       No current facility-administered medications for this visit  Allergies   Allergen Reactions    Simvastatin Myalgia     Other reaction(s): elevated CPK, Rhabdomyolysis    Statins Myalgia     Other reaction(s): Unknown Reaction       Review of Systems   Unable to perform ROS: Dementia       There were no vitals filed for this visit        I spent 15 minutes directly with the patient during this visit    VIRTUAL VISIT DISCLAIMER    Victoria Sinclair acknowledges that he has consented to an online visit or consultation  He understands that the online visit is based solely on information provided by him, and that, in the absence of a face-to-face physical evaluation by the physician, the diagnosis he receives is both limited and provisional in terms of accuracy and completeness  This is not intended to replace a full medical face-to-face evaluation by the physician  Edward Esposito understands and accepts these terms

## 2021-06-11 NOTE — ASSESSMENT & PLAN NOTE
Symptoms remain unchanged   Wife stating she did not receive Namenda titration pack which was sent in to [facility] as wife currently uses [facility] pharmacy now   Recent Namenda titration pack to [facility]   Patient will be starting senior  at [employer] as needed  Manage chronic conditions  Maintain [place] precautions  Encourage patient remain active mentally, physically and socially  Patient to participate in cognitively challenging exercises as able

## 2021-07-14 ENCOUNTER — OFFICE VISIT (OUTPATIENT)
Dept: FAMILY MEDICINE CLINIC | Facility: CLINIC | Age: 81
End: 2021-07-14
Payer: COMMERCIAL

## 2021-07-14 VITALS
TEMPERATURE: 98 F | SYSTOLIC BLOOD PRESSURE: 100 MMHG | RESPIRATION RATE: 16 BRPM | HEART RATE: 70 BPM | WEIGHT: 175.8 LBS | BODY MASS INDEX: 26.64 KG/M2 | HEIGHT: 68 IN | DIASTOLIC BLOOD PRESSURE: 70 MMHG

## 2021-07-14 DIAGNOSIS — Z03.818 ENCOUNTER FOR OBSERVATION FOR SUSPECTED EXPOSURE TO OTHER BIOLOGICAL AGENTS RULED OUT: ICD-10-CM

## 2021-07-14 DIAGNOSIS — I63.313 CEREBROVASCULAR ACCIDENT (CVA) DUE TO BILATERAL THROMBOSIS OF MIDDLE CEREBRAL ARTERIES (HCC): ICD-10-CM

## 2021-07-14 DIAGNOSIS — Z71.89 DNR (DO NOT RESUSCITATE) DISCUSSION: ICD-10-CM

## 2021-07-14 DIAGNOSIS — E03.9 ACQUIRED HYPOTHYROIDISM: ICD-10-CM

## 2021-07-14 DIAGNOSIS — I10 ESSENTIAL HYPERTENSION: ICD-10-CM

## 2021-07-14 DIAGNOSIS — E78.2 MIXED HYPERLIPIDEMIA: ICD-10-CM

## 2021-07-14 DIAGNOSIS — Z23 ENCOUNTER FOR IMMUNIZATION: ICD-10-CM

## 2021-07-14 DIAGNOSIS — G30.1 LATE ONSET ALZHEIMER'S DEMENTIA WITHOUT BEHAVIORAL DISTURBANCE (HCC): Primary | ICD-10-CM

## 2021-07-14 DIAGNOSIS — F02.80 LATE ONSET ALZHEIMER'S DEMENTIA WITHOUT BEHAVIORAL DISTURBANCE (HCC): Primary | ICD-10-CM

## 2021-07-14 PROCEDURE — 1160F RVW MEDS BY RX/DR IN RCRD: CPT | Performed by: FAMILY MEDICINE

## 2021-07-14 PROCEDURE — 3078F DIAST BP <80 MM HG: CPT | Performed by: FAMILY MEDICINE

## 2021-07-14 PROCEDURE — 90714 TD VACC NO PRESV 7 YRS+ IM: CPT

## 2021-07-14 PROCEDURE — 99214 OFFICE O/P EST MOD 30 MIN: CPT | Performed by: FAMILY MEDICINE

## 2021-07-14 PROCEDURE — 1036F TOBACCO NON-USER: CPT | Performed by: FAMILY MEDICINE

## 2021-07-14 PROCEDURE — 3074F SYST BP LT 130 MM HG: CPT | Performed by: FAMILY MEDICINE

## 2021-07-14 PROCEDURE — 90471 IMMUNIZATION ADMIN: CPT

## 2021-07-14 NOTE — PATIENT INSTRUCTIONS
Problem List Items Addressed This Visit     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 ([facility] )     No new findings  Continue risk factor modification  Dementia ([facility] ) - Primary     Significant dementia issues  He is not able to be left alone  He is going to be admitted to NH for this  No changes  Relevant Orders    TSH, 3rd generation    DNR (do not resuscitate) discussion     Patient is dementia patient  Reviewed with son about DNR  Son will discuss with patient's wife about DNR and POLST form  I reviewed with son  I will sign after family review, and call her to confirm  Hyperlipidemia     Not on medications  Patient doing well for now  Not tolerant of statins  Hypertension     Not on medications, and his BP is excellent  No changes  Relevant Orders    TSH, 3rd generation    Hypothyroidism     Has some issues with temp regulation  Check TSH  Relevant Orders    TSH, 3rd generation      Other Visit Diagnoses     Encounter for observation for suspected exposure to other biological agents ruled out        Relevant Orders    Novel Coronavirus (Covid-19),PCR [facility] - Collected in Office          COVID 19 Instructions    Kota Tovar was advised to limit contact with others to essential tasks such as getting food, medications, and medical care  Proper handwashing reviewed, and Hand sanitzer when washing is not available  If the patient develops symptoms of COVID 19, the patient should call the office as soon as possible  For 8001-1733 Flu season, it is strongly recommended that Flu Vaccinations be obtained  Virtual Visits may be conducted in several ways: [place]: You should get a text message when the provider is ready to see you  Click on the link in the text message, and the call should start  You will need to type in your name, and allow camera and microphone access    This is HIPPA compliant, and secure  Please try to download Google Duo  Once you do download this on your phone, you will be prompted to add your phone number to the account  After that, he should receive a text from [employer], and use that code to verify your phone number  After that, you should be able to use Google Duo to receive and make video calls  Please download Microsoft Teams to your phone or computer  You would get an email with the meeting after scheduling with the office  You will Join the meeting, and wait there till the provider joins as well  Instructions for downloading this are available from the office  This is HIPPA compliant, and secure  We are committed to getting you vaccinated as soon as possible and will be closely following CDC and [facility]  guidelines as they are released and revised  Please refer to our COVID-19 vaccine webpage for the most up to date information on the vaccine and our distribution efforts  [employer] tn    OUR NEW LOCATION:  Starting around 28June2021, our new location and phone are:    [address], [address] [place], [place], [address]  Fax: [phone]    Lab services and OB/GYN will be at this location as well

## 2021-07-14 NOTE — ASSESSMENT & PLAN NOTE
Patient is dementia patient  Reviewed with son about DNR  Son will discuss with patient's wife about DNR and POLST form  I reviewed with son  I will sign after family review, and call her to confirm

## 2021-07-14 NOTE — PROGRESS NOTES
Assessment and Plan:    Problem List Items Addressed This Visit     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 ([facility] [address] )     No new findings  Continue risk factor modification  Dementia ([facility] ) - Primary     Significant dementia issues  He is not able to be left alone  He is going to be admitted to NH for this  No changes  Relevant Orders    TSH, 3rd generation    DNR (do not resuscitate) discussion     Patient is dementia patient  Reviewed with son about DNR  Son will discuss with patient's wife about DNR and POLST form  I reviewed with son  I will sign after family review, and call her to confirm  Hyperlipidemia     Not on medications  Patient doing well for now  Not tolerant of statins  Hypertension     Not on medications, and his BP is excellent  No changes  Relevant Orders    TSH, 3rd generation    Hypothyroidism     Has some issues with temp regulation  Check TSH  Relevant Orders    TSH, 3rd generation      Other Visit Diagnoses     Encounter for observation for suspected exposure to other biological agents ruled out        Relevant Orders    Novel Coronavirus (Covid-19),PCR [facility] - Collected in Office    Encounter for immunization        Relevant Orders    TD VACCINE GREATER THAN OR EQUAL TO 8YO PRESERVATIVE FREE IM (Completed)                 Diagnoses and all orders for this visit:    Late onset Alzheimer's dementia without behavioral disturbance ([facility] [address] )  -     TSH, 3rd generation; Future    Essential hypertension  -     TSH, 3rd generation; Future    Mixed hyperlipidemia    Acquired hypothyroidism  -     TSH, 3rd generation;  Future    DNR (do not resuscitate) discussion    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 ([facility] )    Encounter for observation for suspected exposure to other biological agents ruled out  -     Novel Coronavirus (Covid-19),PCR [facility] - Collected in Office; Future    Encounter for immunization  -     TD VACCINE GREATER THAN OR EQUAL TO 6YO PRESERVATIVE FREE IM              Subjective:      Patient ID: Marianna Watson is a 79 y o  male  CC:    Chief Complaint   Patient presents with    Follow-up     Patient here for six month f/u       HPI:    Patient is here to follow-up on dementia  He has significant issues with this  Family is now concerned that he should not be left alone  They would like to have him admitted to a nursing home for long-term care secondary to the dementia  Reviewed mA 51 form  Family had a POLST form that was incompletely filled out  They had signed and indicated DNR, but no other measures were noted  It was also on white paper, not the pink paper that is supposed to be on  Reviewed with son who is here today, but he was not the set INR of the document, his mother was  Hyperlipidemia:  Patient is not on statin secondary to intolerance  CVA:  No problems currently  Hypertension: Not currently on medications  Goal for blood pressures less than 513 systolic  Hypothyroid:  Patient is not on medications, but son did mention that he has some problems with temperature regulation  They wondered if there was anything in particular they should be doing about this  Reviewed about labs, and he has not had a TSH recently  Reviewed DNR with his son in some detail, and whether not patient/family would want that for the patient              The following portions of the patient's history were reviewed and updated as appropriate: allergies, current medications, past family history, past medical history, past social history, past surgical history and problem list       Review of Systems   Unable to perform ROS: Dementia         Data to review:       Objective:    Vitals:    07/14/21 1747   BP: 100/70   BP Location: Right arm   Patient Position: Sitting   Cuff Size: Adult   Pulse: 70   Resp: 16   Temp: 98 °F (36 7 °C)   TempSrc: Temporal   Weight: 79 7 kg (175 lb 12 8 oz)   Height: 5' 7 72" (1 72 m)        Physical Exam  Vitals and nursing note reviewed  Exam conducted with a chaperone present  Constitutional:       General: He is not in acute distress  Appearance: Normal appearance  He is normal weight  He is not ill-appearing, toxic-appearing or diaphoretic  HENT:      Head: Normocephalic  Right Ear: Hearing and external ear normal  There is impacted cerumen  Left Ear: Hearing, tympanic membrane, ear canal and external ear normal  There is no impacted cerumen  Nose: Nose normal    Eyes:      General: No scleral icterus  Right eye: No discharge  Left eye: No discharge  Extraocular Movements: Extraocular movements intact  Conjunctiva/sclera: Conjunctivae normal       Pupils: Pupils are equal, round, and reactive to light  Cardiovascular:      Rate and Rhythm: Normal rate and regular rhythm  Pulses: Normal pulses  Heart sounds: No murmur heard  No friction rub  No gallop  Pulmonary:      Effort: Pulmonary effort is normal  No respiratory distress  Breath sounds: Normal breath sounds  No stridor  No wheezing, rhonchi or rales  Abdominal:      General: Abdomen is flat  Bowel sounds are normal  There is no distension  Palpations: There is no mass  Tenderness: There is no abdominal tenderness  There is no guarding or rebound  Musculoskeletal:         General: Normal range of motion  Cervical back: Normal range of motion  Skin:     General: Skin is warm and dry  Capillary Refill: Capillary refill takes less than 2 seconds  Coloration: Skin is not jaundiced  Findings: No bruising or erythema  Neurological:      General: No focal deficit present  Mental Status: He is alert and oriented to person, place, and time  Mental status is at baseline  Cranial Nerves: No cranial nerve deficit        Sensory: No sensory deficit  Motor: No weakness  Coordination: Coordination normal       Gait: Gait normal       Deep Tendon Reflexes: Reflexes normal    Psychiatric:         Mood and Affect: Mood normal          Behavior: Behavior normal          Thought Content:  Thought content normal          Judgment: Judgment normal

## 2021-07-14 NOTE — ASSESSMENT & PLAN NOTE
Significant dementia issues  He is not able to be left alone  He is going to be admitted to NH for this  No changes

## 2021-07-19 ENCOUNTER — LAB (OUTPATIENT)
Dept: LAB | Facility: CLINIC | Age: 81
End: 2021-07-19
Payer: COMMERCIAL

## 2021-07-19 DIAGNOSIS — I10 ESSENTIAL HYPERTENSION: ICD-10-CM

## 2021-07-19 DIAGNOSIS — E03.9 ACQUIRED HYPOTHYROIDISM: ICD-10-CM

## 2021-07-19 DIAGNOSIS — R90.82 WHITE MATTER DISEASE: ICD-10-CM

## 2021-07-19 DIAGNOSIS — F02.80 LATE ONSET ALZHEIMER'S DEMENTIA WITHOUT BEHAVIORAL DISTURBANCE (HCC): ICD-10-CM

## 2021-07-19 DIAGNOSIS — R97.20 ELEVATED PROSTATE SPECIFIC ANTIGEN (PSA): ICD-10-CM

## 2021-07-19 DIAGNOSIS — G31.84 AMNESTIC MCI (MILD COGNITIVE IMPAIRMENT WITH MEMORY LOSS): ICD-10-CM

## 2021-07-19 DIAGNOSIS — G30.1 LATE ONSET ALZHEIMER'S DEMENTIA WITHOUT BEHAVIORAL DISTURBANCE (HCC): ICD-10-CM

## 2021-07-19 DIAGNOSIS — R41.3 MEMORY LOSS OR IMPAIRMENT: ICD-10-CM

## 2021-07-19 LAB — TSH SERPL DL<=0.05 MIU/L-ACNC: 5.62 UIU/ML (ref 0.36–3.74)

## 2021-07-19 PROCEDURE — 84443 ASSAY THYROID STIM HORMONE: CPT

## 2021-07-19 PROCEDURE — 36415 COLL VENOUS BLD VENIPUNCTURE: CPT

## 2021-07-19 RX ORDER — LEVOTHYROXINE SODIUM 0.03 MG/1
25 TABLET ORAL
Qty: 30 TABLET | Refills: 5 | Status: SHIPPED | OUTPATIENT
Start: 2021-07-19

## 2021-07-27 ENCOUNTER — TELEPHONE (OUTPATIENT)
Dept: GERIATRICS | Age: 81
End: 2021-07-27

## 2021-07-27 NOTE — TELEPHONE ENCOUNTER
Left message for Nanda to call and confirm if virtual appt for 08/06 @ 2pm would be ok and also for her to call to provide email info for video visit

## 2021-07-27 NOTE — TELEPHONE ENCOUNTER
Patient wife called the office in tears stating that she need guidance and she want to know about his progression   As per the notes below pt is currently at [facility] for respite stay  Pt was admitted 07/21 with a discharge date schedule for 08/18  Nanda is concern that he's overly anxious  She said at this time he's not combative or at least it's not reported by the facility  She said his behavior is just too stressful for her to deal because of her afib dx's and she can't bring him home  The facility asked about long term stay but she's not sure if with his increase agitation they will want him to leave at some point

## 2021-07-28 ENCOUNTER — TELEPHONE (OUTPATIENT)
Dept: GERIATRICS | Age: 81
End: 2021-07-28

## 2021-07-28 ENCOUNTER — TELEMEDICINE (OUTPATIENT)
Dept: GERIATRICS | Age: 81
End: 2021-07-28
Payer: COMMERCIAL

## 2021-07-28 DIAGNOSIS — G30.1 LATE ONSET ALZHEIMER'S DEMENTIA WITH BEHAVIORAL DISTURBANCE (HCC): Primary | ICD-10-CM

## 2021-07-28 DIAGNOSIS — F02.81 LATE ONSET ALZHEIMER'S DEMENTIA WITH BEHAVIORAL DISTURBANCE (HCC): Primary | ICD-10-CM

## 2021-07-28 PROCEDURE — 99214 OFFICE O/P EST MOD 30 MIN: CPT | Performed by: NURSE PRACTITIONER

## 2021-07-28 PROCEDURE — 1160F RVW MEDS BY RX/DR IN RCRD: CPT | Performed by: NURSE PRACTITIONER

## 2021-07-28 RX ORDER — QUETIAPINE FUMARATE 25 MG/1
12.5 TABLET, FILM COATED ORAL DAILY
Qty: 30 TABLET | Refills: 0 | Status: SHIPPED | OUTPATIENT
Start: 2021-07-28 | End: 2021-08-05 | Stop reason: DRUGHIGH

## 2021-07-28 NOTE — TELEPHONE ENCOUNTER
Our office contact the patient wife Nanda per instruction from [place], [place] to schedule Hui Assess  Nanda said she was not home to look at her calendar  She will give our office a call to schedule appt

## 2021-07-28 NOTE — TELEPHONE ENCOUNTER
[facility] called to request sooner appointment for patient    Also requests Provider call Karla Jeans (clinical) at [facility] ([phone])  This MR relayed that detailed notes from previous conversations are in chart and Provider aware; appointment scheduled and this MR will provide detailed information to provider for recommendation  Caller relayed in detail to this MR, that patient is in respite care at [facility] and may possibly be long term at same  Issues of urgent concern are: Patient has been displaying anger and anxiety issues, and aggressive behavior  [facility] requesting medication for patient  Currently patient scheduled with Provider for virtual on 8/6 at [facility] to address behaviors  Email for [facility] is [email or web address]    Please advise  Should have sooner appt with Provider (available is 7/29 at 8:30AM, 7/30 at 2PM (same day appt) or have an appointment with CRNP?

## 2021-07-28 NOTE — TELEPHONE ENCOUNTER
Patient's spouse called office; scheduled in-office Hui Re-Assess for 8/11 at [facility] with [address]  At this appointment, spouse would like to discuss patient's  future living arrangementst; spouse asking for direction regarding placing him in LTC or keep him at home  This MR including LCSW with this message for her assistance as well

## 2021-07-28 NOTE — TELEPHONE ENCOUNTER
Left message with [facility] for Charlett Leaven to call back; regarding scheduling virtual appointment today, 7/28/21, with CRNP and patient per patient's Geriatric Provider

## 2021-07-28 NOTE — PATIENT INSTRUCTIONS
1  Take Seroquel 12 5 mg tablet daily around 1700 and 12 5 mg daily prn for increased agitation  2   Follow-up in 2 weeks with Dr Eugenia Alvarez for a Geriatric Assessment

## 2021-07-28 NOTE — TELEPHONE ENCOUNTER
Karla Jeans returned call; virtual appointment scheduled w/[facility] for today, 7/28 at 1PM  [address]  [facility] sent to [facility]  Facility will relay appointment date/time to patient's spouse; spouse is with patient at this current time visiting  This MR relayed to Karla Jeans that if patient is admitted to facility for LTC, that doctor will no longer be patient's PCP

## 2021-07-28 NOTE — PROGRESS NOTES
Virtual Regular Visit    Verification of patient location:    Patient is located in the following state in which I hold an active license [place]      Assessment/Plan:    Problem List Items Addressed This Visit        Nervous and Auditory    Dementia ([facility] [address] ) - Primary     · History of dementia and is currently staying in respite care on memory unit  · He is displaying increased behaviors most likely due to the recent transition  · Will order Seroquel 12 5 mg PO in the afternoon before PM care and 12 5 mg PO daily prn for increased agitation  · Encourage physical, mental and cognitive activities as tolerated  · Redirect, reorient and reassure  · Follow-up in 2 weeks for a Hui-Assessment to discuss possible long term care placement if needed  Relevant Medications    QUEtiapine (SEROquel) 25 mg tablet               Reason for visit is   Chief Complaint   Patient presents with    Virtual Regular Visit    Virtual Regular Visit        Encounter provider Nikky Mann    Provider located at [address] [place] [address] Road  [address] [place] [address] [address] [place] [address]  [phone]      Recent Visits  Date Type Provider Dept   07/28/21 Telephone [facility]   07/28/21 Telemedicine Aye Mann    07/27/21 Telephone [facility]   Showing recent visits within past 7 days and meeting all other requirements  Future Appointments  No visits were found meeting these conditions  Showing future appointments within next 150 days and meeting all other requirements       The patient was identified by name and date of birth  Darin Diaz was informed that this is a telemedicine visit and that the visit is being conducted through [employer] and patient was informed that this is a secure, HIPAA-compliant platform  He agrees to proceed     My office door was closed  No one else was in the room  He acknowledged consent and understanding of privacy and security of the video platform  The patient has agreed to participate and understands they can discontinue the visit at any time  Patient is aware this is a billable service  Subjective  Bobbi Miles is a 79 y o  male seen and examined via telemedicine visit today  He is currently staying on the memory care memory unit at [facility]  Patient is currently in respite care  He is reported to be displaying anger and anxiety issues and aggressive behavior on the unit, especially in the evenings after his wife leaves  He often wanders  into other residents rooms which often develops into verbal altercations with them  Upon examination with his wife and the nurse present, patient is calm, cooperative and in no acute distress  He is without complaint today  He does have difficulty with word finding and explaining how he is feeling  Staff reports that he often wakes up during the night due to his roommate and is unable to go back to sleep  He is also resisting care, worse in the evening with PM care  HPI     Past Medical History:   Diagnosis Date    Basal cell adenocarcinoma     right thigh       Past Surgical History:   Procedure Laterality Date    VASECTOMY         Current Outpatient Medications   Medication Sig Dispense Refill    multivitamin (THERAGRAN) TABS Take 1 tablet by mouth daily      Omega-3 Fatty Acids ([employer] FISH OIL) 1000 MG CAPS Take by mouth      levothyroxine 25 mcg tablet Take 1 tablet (25 mcg total) by mouth daily in the early morning (Patient not taking: Reported on 7/28/2021) 30 tablet 5    memantine (Namenda Titration Charles) Take by mouth see administration instructions Follow package directions   (Patient not taking: Reported on 7/28/2021) 30 tablet 0    QUEtiapine (SEROquel) 25 mg tablet Take 0 5 tablets (12 5 mg total) by mouth daily Seroquel 12 5 mg daily and 12 5 mg daily prn for increased agitation 30 tablet 0     No current facility-administered medications for this visit  Allergies   Allergen Reactions    Simvastatin Myalgia     Other reaction(s): elevated CPK, Rhabdomyolysis    Statins Myalgia     Other reaction(s): Unknown Reaction       Review of Systems   Respiratory: Negative for cough, chest tightness, shortness of breath and wheezing  Cardiovascular: Negative for chest pain, palpitations and leg swelling  Gastrointestinal: Negative for abdominal pain, constipation, diarrhea, nausea and vomiting  Genitourinary: Negative for difficulty urinating, dysuria, flank pain and frequency  Musculoskeletal: Negative for arthralgias, back pain, gait problem and myalgias  Skin: Negative for color change, pallor, rash and wound  Neurological: Negative for dizziness, weakness, light-headedness and headaches  Psychiatric/Behavioral: Positive for confusion  Negative for agitation, behavioral problems and sleep disturbance  The patient is not nervous/anxious  Video Exam    There were no vitals filed for this visit  Physical Exam  Constitutional:       General: He is not in acute distress  Appearance: Normal appearance  He is obese  He is not ill-appearing or toxic-appearing  HENT:      Head: Normocephalic and atraumatic  Right Ear: External ear normal       Left Ear: External ear normal       Nose: Nose normal       Mouth/Throat:      Mouth: Mucous membranes are moist       Pharynx: Oropharynx is clear  Eyes:      General:         Right eye: No discharge  Left eye: No discharge  Conjunctiva/sclera: Conjunctivae normal    Pulmonary:      Effort: Pulmonary effort is normal       Breath sounds: Normal breath sounds  Abdominal:      General: There is no distension  Musculoskeletal:         General: Normal range of motion  Cervical back: Normal range of motion and neck supple  No rigidity  Right lower leg: No edema     Skin:     General: Skin is warm and dry       Coloration: Skin is not jaundiced or pale  Findings: No bruising, erythema, lesion or rash  Neurological:      General: No focal deficit present  Mental Status: He is alert  Cranial Nerves: No cranial nerve deficit  Coordination: Coordination normal    Psychiatric:         Mood and Affect: Mood normal          Behavior: Behavior normal             I spent 30 minutes with patient today in which greater than 50% of the time was spent in counseling/coordination of care regarding managing dementia with behaviors        VIRTUAL VISIT DISCLAIMER      Shayna Crow verbally agrees to participate in [employer]  Pt is aware that Virtual Care Services could be limited without vital signs or the ability to perform a full hands-on physical exam  Jasbir Kevin understands he or the provider may request at any time to terminate the video visit and request the patient to seek care or treatment in person

## 2021-07-28 NOTE — TELEPHONE ENCOUNTER
Yes please, can we schedule an appt with Russel Grider today   Also please inform facility that if he moves to long term care permanently, we do not follo wt pt thereafter

## 2021-07-29 NOTE — ASSESSMENT & PLAN NOTE
· History of dementia and is currently staying in respite care on memory unit  · He is displaying increased behaviors most likely due to the recent transition  · Will order Seroquel 12 5 mg PO in the afternoon before PM care and 12 5 mg PO daily prn for increased agitation  · Encourage physical, mental and cognitive activities as tolerated  · Redirect, reorient and reassure  · Follow-up in 2 weeks for a Hui-Assessment to discuss possible long term care placement if needed

## 2021-08-05 ENCOUNTER — TELEPHONE (OUTPATIENT)
Dept: GERIATRICS | Facility: OTHER | Age: 81
End: 2021-08-05

## 2021-08-05 DIAGNOSIS — I10 ESSENTIAL HYPERTENSION: ICD-10-CM

## 2021-08-05 DIAGNOSIS — E53.8 VITAMIN B12 DEFICIENCY: ICD-10-CM

## 2021-08-05 DIAGNOSIS — F41.9 ANXIETY: Primary | ICD-10-CM

## 2021-08-05 DIAGNOSIS — E56.9 VITAMIN DEFICIENCY: ICD-10-CM

## 2021-08-05 DIAGNOSIS — F03.91 DEMENTIA WITH BEHAVIORAL DISTURBANCE, UNSPECIFIED DEMENTIA TYPE (HCC): ICD-10-CM

## 2021-08-05 DIAGNOSIS — R35.0 URINARY FREQUENCY: ICD-10-CM

## 2021-08-05 RX ORDER — QUETIAPINE FUMARATE 25 MG/1
25 TABLET, FILM COATED ORAL 2 TIMES DAILY
Qty: 28 TABLET | Refills: 0 | Status: SHIPPED | OUTPATIENT
Start: 2021-08-05

## 2021-08-05 NOTE — TELEPHONE ENCOUNTER
Spoke with wife Teja Ask in regards to medication changes, blood work and urine specimen ordered  She is in agreement to the medication changes and blood work as long as the blood work can be done at the facility  Will follow-up in the office on 08/11/2021

## 2021-08-11 ENCOUNTER — OFFICE VISIT (OUTPATIENT)
Dept: GERIATRICS | Age: 81
End: 2021-08-11
Payer: COMMERCIAL

## 2021-08-11 VITALS
DIASTOLIC BLOOD PRESSURE: 78 MMHG | WEIGHT: 172.6 LBS | BODY MASS INDEX: 25.56 KG/M2 | HEIGHT: 69 IN | RESPIRATION RATE: 16 BRPM | HEART RATE: 94 BPM | SYSTOLIC BLOOD PRESSURE: 126 MMHG | OXYGEN SATURATION: 97 % | TEMPERATURE: 98.1 F

## 2021-08-11 DIAGNOSIS — G30.1 LATE ONSET ALZHEIMER'S DEMENTIA WITH BEHAVIORAL DISTURBANCE (HCC): Primary | ICD-10-CM

## 2021-08-11 DIAGNOSIS — F02.81 LATE ONSET ALZHEIMER'S DEMENTIA WITH BEHAVIORAL DISTURBANCE (HCC): Primary | ICD-10-CM

## 2021-08-11 DIAGNOSIS — I10 ESSENTIAL HYPERTENSION: ICD-10-CM

## 2021-08-11 DIAGNOSIS — F41.9 ANXIETY: ICD-10-CM

## 2021-08-11 DIAGNOSIS — E03.9 ACQUIRED HYPOTHYROIDISM: ICD-10-CM

## 2021-08-11 DIAGNOSIS — R26.81 GAIT INSTABILITY: ICD-10-CM

## 2021-08-11 PROCEDURE — 3078F DIAST BP <80 MM HG: CPT | Performed by: NURSE PRACTITIONER

## 2021-08-11 PROCEDURE — 99215 OFFICE O/P EST HI 40 MIN: CPT | Performed by: NURSE PRACTITIONER

## 2021-08-11 PROCEDURE — 3074F SYST BP LT 130 MM HG: CPT | Performed by: NURSE PRACTITIONER

## 2021-08-11 PROCEDURE — 1160F RVW MEDS BY RX/DR IN RCRD: CPT | Performed by: NURSE PRACTITIONER

## 2021-08-11 RX ORDER — SERTRALINE HYDROCHLORIDE 25 MG/1
25 TABLET, FILM COATED ORAL DAILY
Qty: 30 TABLET | Refills: 2 | Status: SHIPPED | OUTPATIENT
Start: 2021-08-11

## 2021-08-11 NOTE — PROGRESS NOTES
ASSESSMENT AND PLAN:  1  Late onset Alzheimer's dementia with behavioral disturbance ([facility] [address] )  Assessment & Plan:  · History of dementia and is currently staying in respite care on memory unit  · He is displaying increased behaviors most likely due to the recent transition  · Seroquel increased to 25 mg PO BID and Lorazepam gel ordered two times daily prn anxiety  · Encourage physical, mental and cognitive activities as tolerated  · Recommend staying in memory unit long term  · Redirect, reorient and reassure      2  Essential hypertension  Assessment & Plan:  · BP stable and controlled off of medications  · Continue to monitor vital signs on memory unit      3  Anxiety  Assessment & Plan:  · Sertraline 25 mg PO daily ordered  · Continue prn Lorazepam gel  and scheduled Seroquel  · Will wean patient off of Seroquel once Sertraline takes effect  · Follow-up in 2 months    Orders:  -     sertraline (ZOLOFT) 25 mg tablet; Take 1 tablet (25 mg total) by mouth daily    4  Acquired hypothyroidism  Assessment & Plan:  · TSH level elevated at 5 620 on 07/19/2021  · Wife reports patient had difficulty taking Levothyroxine in the past  · Recommend monitoring TSH level with PCP      5  Gait instability  Assessment & Plan:  · TUGT 10 seconds  · No recent falls reported by staff at the facility  · Maintain fall precautions            HPI:    We had the pleasure of evaluating Sinan Rushing who is a 79 y o  male in Geriatric consultation today  Mr Shantel Jacobs is in the office with his wife  He currently resides in [facility] for a respite stay  Wife is considering having the patient stay in the memory unit long term  Upon examination, patient is cooperative with the examination, however, he is unable to provide a history related to dementia  Nursing staff on the unit state that he continues to display behaviors, especially in the evenings when his wife leaves    His Seroquel was recently changed to 25 mg PO BID and he is receiving Ativan gel every 12 hours prn or anxiety  His wife states that she does not want him to be on these medications long term  She would like staff in the nursing home to use more redirecting and reorientation strategies and encourage him to participate in activities that he finds enjoyable such as jigsaw puzzles and reading the newspaper  Memory Issues noticed since 3 year(s)    Memory affected: short term memory loss    Symptoms started: gradual  Over time the memory has:  worsened  Memory issue(s) were noted by: patient and family   Patient has difficulties with medication errors, wandering, driving, cooking and inability to maintain adequate nutrition  Difficulty finding the right word while speaking: Yes  Requires repeat information or asking the same question repeatedly: Yes      Does patient handle own financial affairs such as balancing your checkbook, paying bills, investments: No  Difficulties with handling own financial affairs: Yes    Changes in mood or personality:Yes  Current or previous treatment for depression or anxiety: No  Any hallucination or delusion: No  Fluctuation in alertness: No  Sleep Issues: Yes  Urinary/Stool Incontinence: No  Hearing and vision issue: Yes   Any gait or balance disorder: Yes    Any falls in the last year: No  Does the patient still drive: No       Any recent accidents, citations or getting lost in familiar places : Yes  Are there firearms in the house?: No    Behavioral Symptom List:  pacing  Yes  agressive/combative behavior  Yes  agitated  Yes  temper outbursts  Yes  throwing items No  resistance to care  Yes  forgetfulness of actions Yes  hoarding/hiding objects  No  suspicious  Yes  withdrawn Yes  wandering  Yes  rummaging/pillaging  No  misplacing/losing objects No  personal hygiene problems  No  inappropriate sexual behaviorNo        ROS: Review of Systems   Unable to perform ROS: Dementia       Allergies   Allergen Reactions    Simvastatin Myalgia     Other reaction(s): elevated CPK, Rhabdomyolysis    Statins Myalgia     Other reaction(s): Unknown Reaction       Medications:    Current Outpatient Medications on File Prior to Visit   Medication Sig Dispense Refill    levothyroxine 25 mcg tablet Take 1 tablet (25 mcg total) by mouth daily in the early morning (Patient not taking: Reported on 7/28/2021) 30 tablet 5    lorazepam (ATIVAN) 0 5 mg/mL GEL transdermal gel Place 1 mL (0 5 mg total) on the skin 2 (two) times a day as needed (anxiety) 20 each 0    memantine (Namenda Titration Charles) Take by mouth see administration instructions Follow package directions  (Patient not taking: Reported on 7/28/2021) 30 tablet 0    multivitamin (THERAGRAN) TABS Take 1 tablet by mouth daily      Omega-3 Fatty Acids ([employer] FISH OIL) 1000 MG CAPS Take by mouth      QUEtiapine (SEROquel) 25 mg tablet Take 1 tablet (25 mg total) by mouth 2 (two) times a day One tablet bid at 1200 and 1700 for agitation, hold for sedation 28 tablet 0     No current facility-administered medications on file prior to visit         History:  Past Medical History:   Diagnosis Date    Basal cell adenocarcinoma     right thigh     Past Surgical History:   Procedure Laterality Date    VASECTOMY       Family History   Problem Relation Age of Onset    Alzheimer's disease Mother     Lymphoma Father      Social History     Socioeconomic History    Marital status: /Civil Union     Spouse name: Not on file    Number of children: Not on file    Years of education: Not on file    Highest education level: Not on file   Occupational History    Occupation: retired   Tobacco Use    Smoking status: Never Smoker    Smokeless tobacco: Never Used   Substance and Sexual Activity    Alcohol use: Yes     Comment: occasional    Drug use: No    Sexual activity: Not on file   Other Topics Concern    Not on file   Social History Narrative    Recreational activities skiing     Social Determinants of Health     Financial Resource Strain:     Difficulty of Paying Living Expenses:    Food Insecurity:     Worried About Running Out of Food in the Last Year:     [address] in the Last Year:    Transportation Needs:     Lack of Transportation (Medical):  Lack of Transportation (Non-Medical):    Physical Activity:     Days of Exercise per Week:     Minutes of Exercise per Session:    Stress:     Feeling of Stress :    Social Connections:     Frequency of Communication with Friends and Family:     Frequency of Social Gatherings with Friends and Family:     Attends [employer] Services:     Active Member of Clubs or Organizations:     Attends Club or Organization Meetings:     Marital Status:    Intimate Partner Violence:     Fear of Current or Ex-Partner:     Emotionally Abused:     Physically Abused:     Sexually Abused:      Past Surgical History:   Procedure Laterality Date    VASECTOMY         OBJECTIVE:  Vitals:    08/11/21 1357   BP: 126/78   BP Location: Right arm   Patient Position: Sitting   Cuff Size: Standard   Pulse: 94   Resp: 16   Temp: 98 1 °F (36 7 °C)   TempSrc: Temporal   SpO2: 97%   Weight: 78 3 kg (172 lb 9 6 oz)   Height: 5' 8 5" (1 74 m)     Body mass index is 25 86 kg/m²  Physical Exam  Constitutional:       General: He is not in acute distress  Appearance: Normal appearance  He is normal weight  He is not ill-appearing, toxic-appearing or diaphoretic  HENT:      Head: Normocephalic and atraumatic  Right Ear: Tympanic membrane, ear canal and external ear normal  There is impacted cerumen  Left Ear: Tympanic membrane, ear canal and external ear normal  There is no impacted cerumen  Nose: Nose normal  No congestion or rhinorrhea  Mouth/Throat:      Mouth: Mucous membranes are moist       Pharynx: Oropharynx is clear  No oropharyngeal exudate  Eyes:      General: No scleral icterus  Right eye: No discharge           Left eye: No discharge  Extraocular Movements: Extraocular movements intact  Conjunctiva/sclera: Conjunctivae normal       Pupils: Pupils are equal, round, and reactive to light  Cardiovascular:      Rate and Rhythm: Normal rate and regular rhythm  Pulses: Normal pulses  Heart sounds: Normal heart sounds  Pulmonary:      Effort: Pulmonary effort is normal  No respiratory distress  Breath sounds: Normal breath sounds  No rhonchi or rales  Abdominal:      General: Bowel sounds are normal  There is no distension  Palpations: Abdomen is soft  There is no mass  Tenderness: There is no abdominal tenderness  There is no guarding  Musculoskeletal:         General: No tenderness  Normal range of motion  Cervical back: Normal range of motion and neck supple  No rigidity  Right lower leg: No edema  Left lower leg: No edema  Skin:     General: Skin is warm and dry  Coloration: Skin is not jaundiced  Findings: No bruising, lesion or rash  Neurological:      General: No focal deficit present  Mental Status: He is alert  He is disoriented  Cranial Nerves: No cranial nerve deficit  Motor: No weakness  Gait: Gait abnormal       Comments: Alert and oriented to person only, disoriented to time and place   Psychiatric:         Mood and Affect: Mood normal          Behavior: Behavior normal          MoCA:  6/30  TUGT score: 10 seconds    Labs & Imaging:  No results found for: WBC, HGB, HCT, MCV, PLT  No results found for: SODIUM, K, CL, CO2, BUN, CREATININE, GLUC, CALCIUM  No results found for: [id number]  Lab Results   Component Value Date    [id number] 5 620 (H) 07/19/2021     No results found for: [id number], [id number]   No results found for this or any previous visit

## 2021-08-11 NOTE — PATIENT INSTRUCTIONS
1   Patient taking Seroquel 25 mg two times daily for increased agitation  This is an antipsychotic medication  Lorazepam 0 5 mg gel as needed for anxiety  2  Start Zoloft 25 mg by mouth daily for anxiety  This is an antidepressant medication that takes 4-8 weeks to take effect  Once Zoloft takes effect, will wean patient off of Seroquel  3  Encourage patient to do  jigsaw puzzles,  read the newspaper and encourage patient to participate in scheduled activities  4  Recommend patient continue to reside in personal care, memory unit  5  Notify office at [phone] for any questions  6  Follow-up in 2 months

## 2021-08-12 NOTE — ASSESSMENT & PLAN NOTE
· Sertraline 25 mg PO daily ordered  · Continue prn Lorazepam gel  and scheduled Seroquel  · Will wean patient off of Seroquel once Sertraline takes effect  · Follow-up in 2 months

## 2021-08-12 NOTE — ASSESSMENT & PLAN NOTE
· History of dementia and is currently staying in respite care on memory unit  · He is displaying increased behaviors most likely due to the recent transition  · Seroquel increased to 25 mg PO BID and Lorazepam gel ordered two times daily prn anxiety  · Encourage physical, mental and cognitive activities as tolerated  · Recommend staying in memory unit long term  · Redirect, reorient and reassure

## 2021-08-12 NOTE — ASSESSMENT & PLAN NOTE
· TSH level elevated at 5 620 on 07/19/2021  · Wife reports patient had difficulty taking Levothyroxine in the past  · Recommend monitoring TSH level with PCP

## 2021-09-09 ENCOUNTER — TELEPHONE (OUTPATIENT)
Dept: FAMILY MEDICINE CLINIC | Facility: CLINIC | Age: 81
End: 2021-09-09

## 2021-09-09 NOTE — TELEPHONE ENCOUNTER
TIA MANUEL FROM [employer] CALLED WHERE PATIENT LIVES CALLED  [phone]  SHE IS ASKING ABOUT PT WAS ON SYNTHROID  HE WAS ON THIS MEDICATION  WHY WAS HE TAKEN OFF OF THIS? HIS WIFE SAID HE HAD A REACTION BUT SHE COULDN'T REMEMBER WHEN  PLEASE LET HER KNOW  THANK YOU

## 2021-09-10 NOTE — RESULT ENCOUNTER NOTE
Noted concerns from wife  25 mcg is not large dose, so should not cause significant problems  I also do not see any allergy issues with that medication  This should not be interactions between Seroquel and levothyroxine either

## 2022-04-22 ENCOUNTER — TELEPHONE (OUTPATIENT)
Dept: OTHER | Facility: OTHER | Age: 82
End: 2022-04-22

## 2022-04-23 NOTE — TELEPHONE ENCOUNTER
Hai Suarez called from [employer], providing a new number on call provider can use to call back   Provider was informed via TC

## 2022-09-13 ENCOUNTER — TELEPHONE (OUTPATIENT)
Dept: FAMILY MEDICINE CLINIC | Facility: CLINIC | Age: 82
End: 2022-09-13

## 2023-01-27 ENCOUNTER — TELEPHONE (OUTPATIENT)
Dept: FAMILY MEDICINE CLINIC | Facility: CLINIC | Age: 83
End: 2023-01-27

## 2023-01-31 NOTE — TELEPHONE ENCOUNTER
01/30/23 7:47 PM     The office's request has been received, reviewed, and the patient chart updated  The PCP has successfully been removed with a patient attribution note  This message will now be completed      Thank you  Yi Karimi

## 2025-03-19 NOTE — TELEPHONE ENCOUNTER
Zev Son from [facility] ([phone]) called to relay that patient is having increased aggressive behavior  Caller notes that where they had been able to redirect patient, it is becoming more difficult to do so  Caller is requesting  prn Seroquel prescription for patient  Caller also relays patient is not taking Memantine; spouse did not start the medication for patient  Please advise  No (0)